# Patient Record
Sex: FEMALE | Race: WHITE | Employment: UNEMPLOYED | URBAN - METROPOLITAN AREA
[De-identification: names, ages, dates, MRNs, and addresses within clinical notes are randomized per-mention and may not be internally consistent; named-entity substitution may affect disease eponyms.]

---

## 2024-09-06 ENCOUNTER — HOSPITAL ENCOUNTER (EMERGENCY)
Age: 24
Discharge: HOME | End: 2024-09-06
Attending: STUDENT IN AN ORGANIZED HEALTH CARE EDUCATION/TRAINING PROGRAM

## 2024-09-06 VITALS
SYSTOLIC BLOOD PRESSURE: 133 MMHG | OXYGEN SATURATION: 99 % | BODY MASS INDEX: 20.89 KG/M2 | TEMPERATURE: 98.5 F | RESPIRATION RATE: 16 BRPM | WEIGHT: 117.9 LBS | HEART RATE: 100 BPM | DIASTOLIC BLOOD PRESSURE: 81 MMHG

## 2024-09-06 DIAGNOSIS — D64.9 ANEMIA, UNSPECIFIED TYPE: ICD-10-CM

## 2024-09-06 DIAGNOSIS — R82.71 ASYMPTOMATIC BACTERIURIA DURING PREGNANCY: ICD-10-CM

## 2024-09-06 DIAGNOSIS — R11.2 NAUSEA AND VOMITING, UNSPECIFIED VOMITING TYPE: Primary | ICD-10-CM

## 2024-09-06 DIAGNOSIS — O99.891 ASYMPTOMATIC BACTERIURIA DURING PREGNANCY: ICD-10-CM

## 2024-09-06 LAB
ALBUMIN SERPL-MCNC: 4.7 G/DL (ref 3.5–5.2)
ALP SERPL-CCNC: 38 U/L (ref 35–104)
ALT SERPL-CCNC: <5 U/L (ref 0–32)
ANION GAP SERPL CALCULATED.3IONS-SCNC: 16 MMOL/L (ref 7–16)
AST SERPL-CCNC: 17 U/L (ref 0–31)
B-HCG SERPL EIA 3RD IS-ACNC: ABNORMAL MIU/ML (ref 0–7)
BACTERIA URNS QL MICRO: ABNORMAL
BASOPHILS # BLD: 0.08 K/UL (ref 0–0.2)
BASOPHILS NFR BLD: 1 % (ref 0–2)
BILIRUB SERPL-MCNC: 1 MG/DL (ref 0–1.2)
BILIRUB UR QL STRIP: NEGATIVE
BUN SERPL-MCNC: 7 MG/DL (ref 6–20)
CALCIUM SERPL-MCNC: 9.9 MG/DL (ref 8.6–10.2)
CHLORIDE SERPL-SCNC: 100 MMOL/L (ref 98–107)
CLARITY UR: ABNORMAL
CO2 SERPL-SCNC: 20 MMOL/L (ref 22–29)
COLOR UR: YELLOW
CREAT SERPL-MCNC: 0.5 MG/DL (ref 0.5–1)
EOSINOPHIL # BLD: 0.09 K/UL (ref 0.05–0.5)
EOSINOPHILS RELATIVE PERCENT: 1 % (ref 0–6)
ERYTHROCYTE [DISTWIDTH] IN BLOOD BY AUTOMATED COUNT: 20.6 % (ref 11.5–15)
GFR, ESTIMATED: >90 ML/MIN/1.73M2
GLUCOSE SERPL-MCNC: 113 MG/DL (ref 74–99)
GLUCOSE UR STRIP-MCNC: NEGATIVE MG/DL
HCT VFR BLD AUTO: 30.5 % (ref 34–48)
HGB BLD-MCNC: 8.9 G/DL (ref 11.5–15.5)
HGB UR QL STRIP.AUTO: NEGATIVE
IMM GRANULOCYTES # BLD AUTO: <0.03 K/UL (ref 0–0.58)
IMM GRANULOCYTES NFR BLD: 0 % (ref 0–5)
KETONES UR STRIP-MCNC: >80 MG/DL
LEUKOCYTE ESTERASE UR QL STRIP: ABNORMAL
LYMPHOCYTES NFR BLD: 2.66 K/UL (ref 1.5–4)
LYMPHOCYTES RELATIVE PERCENT: 38 % (ref 20–42)
MAGNESIUM SERPL-MCNC: 1.9 MG/DL (ref 1.6–2.6)
MCH RBC QN AUTO: 17.5 PG (ref 26–35)
MCHC RBC AUTO-ENTMCNC: 29.2 G/DL (ref 32–34.5)
MCV RBC AUTO: 60 FL (ref 80–99.9)
MONOCYTES NFR BLD: 0.38 K/UL (ref 0.1–0.95)
MONOCYTES NFR BLD: 5 % (ref 2–12)
NEUTROPHILS NFR BLD: 54 % (ref 43–80)
NEUTS SEG NFR BLD: 3.78 K/UL (ref 1.8–7.3)
NITRITE UR QL STRIP: NEGATIVE
PH UR STRIP: 6.5 [PH] (ref 5–9)
PLATELET # BLD AUTO: 379 K/UL (ref 130–450)
PMV BLD AUTO: 9.6 FL (ref 7–12)
POTASSIUM SERPL-SCNC: 3.8 MMOL/L (ref 3.5–5)
PROT SERPL-MCNC: 7.5 G/DL (ref 6.4–8.3)
PROT UR STRIP-MCNC: NEGATIVE MG/DL
RBC # BLD AUTO: 5.08 M/UL (ref 3.5–5.5)
RBC # BLD: ABNORMAL 10*6/UL
RBC # BLD: ABNORMAL 10*6/UL
RBC #/AREA URNS HPF: ABNORMAL /HPF
SODIUM SERPL-SCNC: 136 MMOL/L (ref 132–146)
SP GR UR STRIP: 1.02 (ref 1–1.03)
UROBILINOGEN UR STRIP-ACNC: 0.2 EU/DL (ref 0–1)
WBC #/AREA URNS HPF: ABNORMAL /HPF
WBC OTHER # BLD: 7 K/UL (ref 4.5–11.5)

## 2024-09-06 PROCEDURE — 81001 URINALYSIS AUTO W/SCOPE: CPT

## 2024-09-06 PROCEDURE — 99284 EMERGENCY DEPT VISIT MOD MDM: CPT

## 2024-09-06 PROCEDURE — 85025 COMPLETE CBC W/AUTO DIFF WBC: CPT

## 2024-09-06 PROCEDURE — 87086 URINE CULTURE/COLONY COUNT: CPT

## 2024-09-06 PROCEDURE — 84702 CHORIONIC GONADOTROPIN TEST: CPT

## 2024-09-06 PROCEDURE — 2580000003 HC RX 258: Performed by: STUDENT IN AN ORGANIZED HEALTH CARE EDUCATION/TRAINING PROGRAM

## 2024-09-06 PROCEDURE — 80053 COMPREHEN METABOLIC PANEL: CPT

## 2024-09-06 PROCEDURE — 87077 CULTURE AEROBIC IDENTIFY: CPT

## 2024-09-06 PROCEDURE — 6370000000 HC RX 637 (ALT 250 FOR IP): Performed by: STUDENT IN AN ORGANIZED HEALTH CARE EDUCATION/TRAINING PROGRAM

## 2024-09-06 PROCEDURE — 83735 ASSAY OF MAGNESIUM: CPT

## 2024-09-06 RX ORDER — CEPHALEXIN 500 MG/1
500 CAPSULE ORAL ONCE
Status: COMPLETED | OUTPATIENT
Start: 2024-09-06 | End: 2024-09-06

## 2024-09-06 RX ORDER — CEPHALEXIN 500 MG/1
500 CAPSULE ORAL 4 TIMES DAILY
Qty: 20 CAPSULE | Refills: 0 | Status: SHIPPED | OUTPATIENT
Start: 2024-09-06 | End: 2024-09-11

## 2024-09-06 RX ORDER — 0.9 % SODIUM CHLORIDE 0.9 %
1000 INTRAVENOUS SOLUTION INTRAVENOUS ONCE
Status: COMPLETED | OUTPATIENT
Start: 2024-09-06 | End: 2024-09-06

## 2024-09-06 RX ORDER — ONDANSETRON 2 MG/ML
4 INJECTION INTRAMUSCULAR; INTRAVENOUS ONCE
Status: DISCONTINUED | OUTPATIENT
Start: 2024-09-06 | End: 2024-09-07 | Stop reason: HOSPADM

## 2024-09-06 RX ADMIN — CEPHALEXIN 500 MG: 500 CAPSULE ORAL at 22:55

## 2024-09-06 RX ADMIN — SODIUM CHLORIDE 1000 ML: 9 INJECTION, SOLUTION INTRAVENOUS at 21:55

## 2024-09-06 ASSESSMENT — LIFESTYLE VARIABLES
HOW MANY STANDARD DRINKS CONTAINING ALCOHOL DO YOU HAVE ON A TYPICAL DAY: PATIENT DOES NOT DRINK
HOW OFTEN DO YOU HAVE A DRINK CONTAINING ALCOHOL: NEVER

## 2024-09-06 ASSESSMENT — PAIN - FUNCTIONAL ASSESSMENT: PAIN_FUNCTIONAL_ASSESSMENT: NONE - DENIES PAIN

## 2024-09-07 NOTE — ED PROVIDER NOTES
[facility] EMERGENCY DEPARTMENT  EMERGENCY DEPARTMENT ENCOUNTER    Pt Name: Cathi Rosales  MRN: [id number]  Birthdate 2000  Date of evaluation: 9/6/2024  Provider: Michael García MD  PCP: No primary care provider on file.  Note Started: 10:06 PM EDT 9/6/24    HPI     Patient is a 24 y.o. female presents with a chief complaint of   Chief Complaint   Patient presents with    Emesis     Around 10 wks pregnant (not verified by OBGYN)- emesis x 1 wk. Lost 8lbs in past wk.   .    Patient presents for emesis.  Patient reportedly is about 10 weeks pregnant.  Patient is a G4, P2.  Patient states that she has not had an ultrasound yet.  No abdominal pain.  No vaginal discharge.  No bleeding.  No blood in her vomit.  No dark tarry stool.  Patient does have a history of hyperemesis gravidarum and states this is consistent with previous pregnancies in fact this is probably slightly better than prior.  Patient has been taking B6 at home without improvement    Nursing Notes were all reviewed and agreed with or any disagreements were addressed in the HPI.    History From: Patient and patient's  is at bedside    Review of Systems   Pertinent positives and negatives as per HPI.     Physical Exam  Vitals and nursing note reviewed.   Constitutional:       Appearance: She is well-developed.   HENT:      Head: Normocephalic and atraumatic.   Eyes:      Conjunctiva/sclera: Conjunctivae normal.   Cardiovascular:      Rate and Rhythm: Normal rate and regular rhythm.      Heart sounds: Normal heart sounds. No murmur heard.  Pulmonary:      Effort: Pulmonary effort is normal. No respiratory distress.      Breath sounds: Normal breath sounds. No wheezing or rales.   Abdominal:      General: Bowel sounds are normal.      Palpations: Abdomen is soft.      Tenderness: There is no abdominal tenderness. There is no guarding or rebound.   Musculoskeletal:      Cervical back: Normal range of motion and neck  daily for 5 days       Diagnosis:  1. Nausea and vomiting, unspecified vomiting type    2. Anemia, unspecified type    3. Asymptomatic bacteriuria during pregnancy        Disposition:  Patient's disposition: Discharge to home  Patient's condition is stable.                                           Michael García MD  09/06/24 4368

## 2024-09-08 LAB
MICROORGANISM SPEC CULT: ABNORMAL
SERVICE CMNT-IMP: ABNORMAL
SPECIMEN DESCRIPTION: ABNORMAL

## 2024-09-09 LAB
MICROORGANISM SPEC CULT: ABNORMAL
SERVICE CMNT-IMP: ABNORMAL
SPECIMEN DESCRIPTION: ABNORMAL

## 2024-11-27 ENCOUNTER — TELEPHONE (OUTPATIENT)
Dept: OBGYN | Age: 24
End: 2024-11-27

## 2024-11-27 NOTE — TELEPHONE ENCOUNTER
Called patient to reschedule her appt originally scheduled for 12/3/24 1030 am to 12/4/24 with Carol Ann Bonilla. I left the [phone] number for her to call back at her earliest convenience in order to not interrupt her prenatal appts.

## 2024-12-04 ENCOUNTER — ROUTINE PRENATAL (OUTPATIENT)
Dept: OBGYN | Age: 24
End: 2024-12-04

## 2024-12-04 ENCOUNTER — HOSPITAL ENCOUNTER (OUTPATIENT)
Age: 24
Discharge: HOME | End: 2024-12-04

## 2024-12-04 VITALS
TEMPERATURE: 97.1 F | HEIGHT: 63 IN | OXYGEN SATURATION: 98 % | SYSTOLIC BLOOD PRESSURE: 121 MMHG | DIASTOLIC BLOOD PRESSURE: 62 MMHG | HEART RATE: 89 BPM | WEIGHT: 138 LBS | BODY MASS INDEX: 24.45 KG/M2

## 2024-12-04 DIAGNOSIS — E03.9 HYPOTHYROIDISM, UNSPECIFIED TYPE: ICD-10-CM

## 2024-12-04 DIAGNOSIS — Z3A.22 PREGNANCY WITH 22 COMPLETED WEEKS GESTATION: Primary | ICD-10-CM

## 2024-12-04 LAB
GLUCOSE URINE, POC: NEGATIVE MG/DL
PROTEIN UA: NEGATIVE
TSH SERPL DL<=0.05 MIU/L-ACNC: 2.44 UIU/ML (ref 0.27–4.2)

## 2024-12-04 PROCEDURE — 81002 URINALYSIS NONAUTO W/O SCOPE: CPT | Performed by: MIDWIFE

## 2024-12-04 PROCEDURE — 84443 ASSAY THYROID STIM HORMONE: CPT

## 2024-12-04 PROCEDURE — 36415 COLL VENOUS BLD VENIPUNCTURE: CPT

## 2024-12-04 PROCEDURE — 99203 OFFICE O/P NEW LOW 30 MIN: CPT | Performed by: MIDWIFE

## 2024-12-04 RX ORDER — FERROUS SULFATE 325(65) MG
1 TABLET ORAL 2 TIMES DAILY
COMMUNITY
Start: 2024-10-08

## 2024-12-04 RX ORDER — CHOLECALCIFEROL (VITAMIN D3) 50 MCG
1 TABLET ORAL DAILY
COMMUNITY
Start: 2024-10-08

## 2024-12-04 SDOH — ECONOMIC STABILITY: INCOME INSECURITY: HOW HARD IS IT FOR YOU TO PAY FOR THE VERY BASICS LIKE FOOD, HOUSING, MEDICAL CARE, AND HEATING?: NOT HARD AT ALL

## 2024-12-04 SDOH — ECONOMIC STABILITY: FOOD INSECURITY: WITHIN THE PAST 12 MONTHS, YOU WORRIED THAT YOUR FOOD WOULD RUN OUT BEFORE YOU GOT MONEY TO BUY MORE.: NEVER TRUE

## 2024-12-04 SDOH — ECONOMIC STABILITY: FOOD INSECURITY: WITHIN THE PAST 12 MONTHS, THE FOOD YOU BOUGHT JUST DIDN'T LAST AND YOU DIDN'T HAVE MONEY TO GET MORE.: NEVER TRUE

## 2024-12-04 ASSESSMENT — PATIENT HEALTH QUESTIONNAIRE - PHQ9
SUM OF ALL RESPONSES TO PHQ9 QUESTIONS 1 & 2: 0
1. LITTLE INTEREST OR PLEASURE IN DOING THINGS: NOT AT ALL
SUM OF ALL RESPONSES TO PHQ QUESTIONS 1-9: 0
2. FEELING DOWN, DEPRESSED OR HOPELESS: NOT AT ALL

## 2024-12-04 NOTE — PROGRESS NOTES
, return OB at 22w5d weeks    Patient Active Problem List   Diagnosis    Hypothyroidism        Subjective:  doing well    Had anatomy scan at Darleen's office    Feels very tired for the past week or so, getting plenty of sleep, \"too much\".  Has a history of hypothyroidism and was treated in previous pregnancies.    Not getting much sunlight    Bleeding no   Leaking of fluid no   Painful cramping/contractions no   Headache no   Epigastric pain no   Edema no     Fetal movement + since 18-19 weeks    Objective:  See flowsheet    Vitals:    24 1122   BP: 121/62   Pulse: 89   Temp: 97.1 °F (36.2 °C)   SpO2: 98%     Hx hypothyroidism, last TSH not available to review    FH 22      Assessment:   at 22w5d   Blood pressure WNL  Size equals dates  Total weight gain appropriate   rhogam:  Not eligible    ICD-10-CM    1. Pregnancy with 22 completed weeks gestation  Z3A.22       2. Hypothyroidism, unspecified type  E03.9 TSH           Plan:    RTO 4 weeks  Continue prenatal vitamins    Orders Placed This Encounter   Procedures    TSH     Standing Status:   Future     Standing Expiration Date:   2025      Fetal movement:  Baby should move 10 times every 2 hours.  If movements decrease below 10 in 2 hours, or decrease from what is typical for that pregnancy, patient should eat something, drink something, and lay down to count movements.  If she does not feel 10 movements after doing these things, she is to immediately proceed to L&D for NST.  She should NOT WAIT until the next day.     labor precautions discussed with patient.  Patient should report >4 contractions/tightenings in 1 hour after first emptying bladder, laying down, drinking 2 large glasses of water.  Should also promptly report any vaginal bleeding, menstrual-type cramping, dysuria, urgency or frequency.

## 2024-12-04 NOTE — PROGRESS NOTES
New patient alert and pleasant with complaints of feeling burned out the last couple weeks.   Here today for initial prenatal visit, she sees Darleen Edwards  Urine for glucose and protein obtained   Discharge instructions have been discussed with the patient. Patient advised to call our office with any questions or concerns.   Voiced understanding.

## 2024-12-30 ENCOUNTER — TELEPHONE (OUTPATIENT)
Dept: OBGYN | Age: 24
End: 2024-12-30

## 2024-12-30 ENCOUNTER — ROUTINE PRENATAL (OUTPATIENT)
Dept: OBGYN | Age: 24
End: 2024-12-30
Payer: COMMERCIAL

## 2024-12-30 ENCOUNTER — HOSPITAL ENCOUNTER (OUTPATIENT)
Age: 24
Discharge: HOME | End: 2024-12-30
Payer: COMMERCIAL

## 2024-12-30 VITALS
SYSTOLIC BLOOD PRESSURE: 110 MMHG | DIASTOLIC BLOOD PRESSURE: 68 MMHG | HEART RATE: 94 BPM | BODY MASS INDEX: 25.33 KG/M2 | WEIGHT: 143 LBS

## 2024-12-30 DIAGNOSIS — O26.02 EXCESSIVE WEIGHT GAIN DURING PREGNANCY IN SECOND TRIMESTER: ICD-10-CM

## 2024-12-30 DIAGNOSIS — Z86.39 HISTORY OF HYPOTHYROIDISM: ICD-10-CM

## 2024-12-30 DIAGNOSIS — O44.42 LOW LYING PLACENTA NOS OR WITHOUT HEMORRHAGE, SECOND TRIMESTER: ICD-10-CM

## 2024-12-30 DIAGNOSIS — Z3A.26 26 WEEKS GESTATION OF PREGNANCY: ICD-10-CM

## 2024-12-30 DIAGNOSIS — O99.012 ANEMIA AFFECTING PREGNANCY IN SECOND TRIMESTER: ICD-10-CM

## 2024-12-30 DIAGNOSIS — Z3A.26 26 WEEKS GESTATION OF PREGNANCY: Primary | ICD-10-CM

## 2024-12-30 LAB
ALBUMIN SERPL-MCNC: 3.5 G/DL (ref 3.5–5.2)
ALP SERPL-CCNC: 58 U/L (ref 35–104)
ALT SERPL-CCNC: 13 U/L (ref 0–32)
AMOUNT GLUCOSE GIVEN: 50 G
ANION GAP SERPL CALCULATED.3IONS-SCNC: 9 MMOL/L (ref 7–16)
AST SERPL-CCNC: 16 U/L (ref 0–31)
BILIRUB SERPL-MCNC: 0.4 MG/DL (ref 0–1.2)
BUN SERPL-MCNC: 6 MG/DL (ref 6–20)
CALCIUM SERPL-MCNC: 8.6 MG/DL (ref 8.6–10.2)
CHLORIDE SERPL-SCNC: 101 MMOL/L (ref 98–107)
CO2 SERPL-SCNC: 24 MMOL/L (ref 22–29)
COLLECT TIME, 1HR GLUCOSE: 1418
CREAT SERPL-MCNC: 0.4 MG/DL (ref 0.5–1)
ERYTHROCYTE [DISTWIDTH] IN BLOOD BY AUTOMATED COUNT: 20.5 % (ref 11.5–15)
GFR, ESTIMATED: >90 ML/MIN/1.73M2
GLUCOSE 3H P 100 G GLC PO SERPL-MCNC: 127 MG/DL
GLUCOSE SERPL-MCNC: 123 MG/DL (ref 74–99)
GLUCOSE URINE, POC: NEGATIVE MG/DL
HCT VFR BLD AUTO: 31.3 % (ref 34–48)
HGB BLD-MCNC: 9.5 G/DL (ref 11.5–15.5)
MCH RBC QN AUTO: 23.6 PG (ref 26–35)
MCHC RBC AUTO-ENTMCNC: 30.4 G/DL (ref 32–34.5)
MCV RBC AUTO: 77.7 FL (ref 80–99.9)
PLATELET # BLD AUTO: 273 K/UL (ref 130–450)
PMV BLD AUTO: 10.6 FL (ref 7–12)
POTASSIUM SERPL-SCNC: 3.5 MMOL/L (ref 3.5–5)
PROT SERPL-MCNC: 6.7 G/DL (ref 6.4–8.3)
PROTEIN UA: NEGATIVE
RBC # BLD AUTO: 4.03 M/UL (ref 3.5–5.5)
SODIUM SERPL-SCNC: 134 MMOL/L (ref 132–146)
TSH SERPL DL<=0.05 MIU/L-ACNC: 2.05 UIU/ML (ref 0.27–4.2)
WBC OTHER # BLD: 7.2 K/UL (ref 4.5–11.5)

## 2024-12-30 PROCEDURE — 36415 COLL VENOUS BLD VENIPUNCTURE: CPT

## 2024-12-30 PROCEDURE — 81002 URINALYSIS NONAUTO W/O SCOPE: CPT | Performed by: ADVANCED PRACTICE MIDWIFE

## 2024-12-30 PROCEDURE — 80053 COMPREHEN METABOLIC PANEL: CPT

## 2024-12-30 PROCEDURE — 99213 OFFICE O/P EST LOW 20 MIN: CPT | Performed by: ADVANCED PRACTICE MIDWIFE

## 2024-12-30 PROCEDURE — 0502F SUBSEQUENT PRENATAL CARE: CPT | Performed by: ADVANCED PRACTICE MIDWIFE

## 2024-12-30 PROCEDURE — 82950 GLUCOSE TEST: CPT

## 2024-12-30 PROCEDURE — 85027 COMPLETE CBC AUTOMATED: CPT

## 2024-12-30 PROCEDURE — 84443 ASSAY THYROID STIM HORMONE: CPT

## 2024-12-30 NOTE — PROGRESS NOTES
, return OB at 26w3d weeks    Patient Active Problem List   Diagnosis    Hypothyroidism        Subjective:  doing well    Bleeding no   Leaking of fluid no   Painful cramping/contractions no   Headache no   Epigastric pain no   Edema no     Fetal movement good, patient reports 10 movements in 2 hours    Objective:  See flowsheet    Vitals:    24 1038   BP: 110/68   Pulse: 94       FH 31  -130s  Breech  LGA  Excessive weight gain discussed and risks for GDM  Last u/s in Nov showed low lying placenta, will rohit for MFM  Labs ordered to include Tsh    The patient had her 28 week labs ordered.   @(BLOODTYPE)@  BLOOD TYPING:  Lab Results   Component Value Date    LABANTI NEG 2021    ABORH O POS 2021     RPR:  No components found for: \"LABRPR\"    Glucola screening was not done yet completed:  Results reviewed.  Glucose testing not done yet.     Tdap Vaccine discussed      Assessment:   at 26w3d   Blood pressure WNL  Size greater than dates, Breech  Hx Low lying placenta  Total weight gain excessive up 14# in 4wk   rhogam:  Not eligible    ICD-10-CM    1. 26 weeks gestation of pregnancy  Z3A.26 POCT urine qual dipstick protein     POCT urine qual dipstick glucose           Plan:    RTO 2 weeks  Continue current medications  Ref to MFM  Labs ordered    Orders Placed This Encounter   Procedures    POCT urine qual dipstick protein    POCT urine qual dipstick glucose      Fetal movement:  Baby should move 10 times every 2 hours.  If movements decrease below 10 in 2 hours, or decrease from what is typical for that pregnancy, patient should eat something, drink something, and lay down to count movements.  If she does not feel 10 movements after doing these things, she is to immediately proceed to L&D for NST.  She should NOT WAIT until the next day.     labor precautions discussed with patient.  Patient should report >4 contractions/tightenings in 1 hour after first emptying

## 2024-12-30 NOTE — TELEPHONE ENCOUNTER
Pt was notified of all labs results being wnl except for iron. She does take a supplement and it has improved from 3mo ago, but I rec take supplement bid instead of daily to avoid risk for bld transfusion after birth. Pt states understanding.

## 2025-01-13 ENCOUNTER — ROUTINE PRENATAL (OUTPATIENT)
Dept: OBGYN | Age: 25
End: 2025-01-13
Payer: COMMERCIAL

## 2025-01-13 VITALS
DIASTOLIC BLOOD PRESSURE: 73 MMHG | SYSTOLIC BLOOD PRESSURE: 112 MMHG | HEART RATE: 90 BPM | BODY MASS INDEX: 25.51 KG/M2 | WEIGHT: 144 LBS

## 2025-01-13 DIAGNOSIS — Z3A.28 28 WEEKS GESTATION OF PREGNANCY: Primary | ICD-10-CM

## 2025-01-13 DIAGNOSIS — D50.8 OTHER IRON DEFICIENCY ANEMIA: ICD-10-CM

## 2025-01-13 LAB
GLUCOSE URINE, POC: NEGATIVE MG/DL
PROTEIN UA: NEGATIVE

## 2025-01-13 PROCEDURE — 81002 URINALYSIS NONAUTO W/O SCOPE: CPT | Performed by: ADVANCED PRACTICE MIDWIFE

## 2025-01-13 PROCEDURE — 99213 OFFICE O/P EST LOW 20 MIN: CPT | Performed by: ADVANCED PRACTICE MIDWIFE

## 2025-01-13 SDOH — ECONOMIC STABILITY: FOOD INSECURITY: WITHIN THE PAST 12 MONTHS, THE FOOD YOU BOUGHT JUST DIDN'T LAST AND YOU DIDN'T HAVE MONEY TO GET MORE.: NEVER TRUE

## 2025-01-13 SDOH — ECONOMIC STABILITY: FOOD INSECURITY: WITHIN THE PAST 12 MONTHS, YOU WORRIED THAT YOUR FOOD WOULD RUN OUT BEFORE YOU GOT MONEY TO BUY MORE.: NEVER TRUE

## 2025-01-13 ASSESSMENT — PATIENT HEALTH QUESTIONNAIRE - PHQ9
SUM OF ALL RESPONSES TO PHQ9 QUESTIONS 1 & 2: 0
SUM OF ALL RESPONSES TO PHQ QUESTIONS 1-9: 0
SUM OF ALL RESPONSES TO PHQ QUESTIONS 1-9: 0
1. LITTLE INTEREST OR PLEASURE IN DOING THINGS: NOT AT ALL
2. FEELING DOWN, DEPRESSED OR HOPELESS: NOT AT ALL
SUM OF ALL RESPONSES TO PHQ QUESTIONS 1-9: 0
SUM OF ALL RESPONSES TO PHQ QUESTIONS 1-9: 0

## 2025-01-13 NOTE — PROGRESS NOTES
, return OB at 28w3d weeks    Patient Active Problem List   Diagnosis    Hypothyroidism        Subjective:  doing well no c/o, takiing iron 2 po daily    Bleeding no   Leaking of fluid no   Painful cramping/contractions no   Headache no   Epigastric pain no   Edema no     Fetal movement good, patient reports 10 movements in 2 hours    Objective:  See flowsheet    Vitals:    25 1523   BP: 112/73   Pulse: 90     Reviewed all labs with pt. Reviewed how to take iron.   FH 27.5  FHT 120s    The patient had her 28 week labs.   @(BLOODTYPE)@  BLOOD TYPING:  Lab Results   Component Value Date    LABANTI NEG 2021    ABORH O POS 2021     RPR:  No components found for: \"LABRPR\"    Glucola screening was completed:  Results reviewed.  Labs reviewed and wnl except for KUMAR - enc to take iron 1 po qod, dried apricots, cook in cast iron skillet, take iron w/vit C/juice or fuit.    Tdap Vaccine discussed      Assessment:   at 28w3d   Blood pressure WNL  Size equals dates  Total weight gain appropriate   rhogam:  Not eligible    ICD-10-CM    1. 28 weeks gestation of pregnancy  Z3A.28 POCT urine qual dipstick protein     POCT urine qual dipstick glucose      2. Other iron deficiency anemia  D50.8            Plan:    RTO 2 weeks  Continue current medications  Pt educ: PIH and 3rd tri discomforts    Orders Placed This Encounter   Procedures    POCT urine qual dipstick protein    POCT urine qual dipstick glucose      Fetal movement:  Baby should move 10 times every 2 hours.  If movements decrease below 10 in 2 hours, or decrease from what is typical for that pregnancy, patient should eat something, drink something, and lay down to count movements.  If she does not feel 10 movements after doing these things, she is to immediately proceed to L&D for NST.  She should NOT WAIT until the next day.     labor precautions discussed with patient.  Patient should report >4 contractions/tightenings in

## 2025-01-13 NOTE — PATIENT INSTRUCTIONS
Patient Education        Preeclampsia: Care Instructions  Preeclampsia is high blood pressure and signs of organ damage, usually after 20 weeks of pregnancy. If it's not managed, it can harm you or your baby and lead to dangerous seizures (eclampsia).    Most people with preeclampsia have healthy babies. Preeclampsia usually goes away in the weeks after birth.   In rare cases, symptoms of preeclampsia don't show up until days or weeks after childbirth.     Monitor yourself for symptoms of preeclampsia.  Call your doctor if you have symptoms such as a severe headache, vision changes, or sudden swelling in your face and hands.     Keep track of your blood pressure at home if your doctor asks you to.  Ask your doctor to make sure that the monitor is working and that you're using it right. Follow instructions about when to take your blood pressure and what to avoid before taking your blood pressure.     Take medicines exactly as prescribed.  You may need to take medicine to control your blood pressure.     Don't smoke.  If you smoke, quit or cut back as much as you can. If you need help quitting, talk to your doctor.     Gain a healthy amount of weight.  Talk with your doctor about how much weight gain is healthy for you. Gaining too much weight while you're pregnant may be harmful.   When should you call for help?  Share this information with your partner or a friend. They can help you watch for warning signs.  Call 911  anytime you think you may need emergency care. For example, call if:    You passed out (lost consciousness).     You have a seizure.     You have trouble breathing.     You have chest pain.   Call your doctor now or seek immediate medical care if:    You have symptoms of preeclampsia, such as:  Sudden swelling of your face, hands, or feet.  New vision problems (such as dimness, blurring, or seeing spots).  A severe headache.     Your blood pressure is very high, such as 160/110 or higher.     Your

## 2025-01-27 ENCOUNTER — ROUTINE PRENATAL (OUTPATIENT)
Dept: OBGYN | Age: 25
End: 2025-01-27
Payer: COMMERCIAL

## 2025-01-27 VITALS
DIASTOLIC BLOOD PRESSURE: 63 MMHG | HEART RATE: 101 BPM | WEIGHT: 149 LBS | SYSTOLIC BLOOD PRESSURE: 114 MMHG | BODY MASS INDEX: 26.39 KG/M2

## 2025-01-27 DIAGNOSIS — O99.013 MATERNAL IRON DEFICIENCY ANEMIA AFFECTING PREGNANCY IN THIRD TRIMESTER, ANTEPARTUM: ICD-10-CM

## 2025-01-27 DIAGNOSIS — D50.9 MATERNAL IRON DEFICIENCY ANEMIA AFFECTING PREGNANCY IN THIRD TRIMESTER, ANTEPARTUM: ICD-10-CM

## 2025-01-27 DIAGNOSIS — E55.9 VITAMIN D DEFICIENCY: ICD-10-CM

## 2025-01-27 DIAGNOSIS — Z3A.30 30 WEEKS GESTATION OF PREGNANCY: Primary | ICD-10-CM

## 2025-01-27 LAB
GLUCOSE URINE, POC: NEGATIVE MG/DL
PROTEIN UA: NEGATIVE

## 2025-01-27 PROCEDURE — 81002 URINALYSIS NONAUTO W/O SCOPE: CPT | Performed by: ADVANCED PRACTICE MIDWIFE

## 2025-01-27 PROCEDURE — 99213 OFFICE O/P EST LOW 20 MIN: CPT | Performed by: ADVANCED PRACTICE MIDWIFE

## 2025-01-27 PROCEDURE — 0502F SUBSEQUENT PRENATAL CARE: CPT | Performed by: ADVANCED PRACTICE MIDWIFE

## 2025-01-27 NOTE — PROGRESS NOTES
, return OB at 30w3d weeks    Patient Active Problem List   Diagnosis    Hypothyroidism        Subjective:  doing well    Bleeding no   Leaking of fluid no   Painful cramping/contractions no   Headache no   Epigastric pain no   Edema no     Fetal movement good, patient reports 10 movements in 2 hours    Objective:  See flowsheet    Vitals:    25 1151   BP: 114/63   Pulse: (!) 101       FH 33  -150s  Pt is rohit for u/s in 2 wk  The patient had her 28 week labs completed.   @(BLOODTYPE)@  BLOOD TYPING:  Lab Results   Component Value Date    LABANTI NEG 2021    ABORH O POS 2021     RPR:  No components found for: \"LABRPR\"    Glucola screening was completed:  Results reviewed.      Tdap Vaccine discussed - declined by pt      Assessment:   at 30w3d   Blood pressure WNL  Size greater than dates  Total weight gain appropriate   rhogam:  Not eligible    ICD-10-CM    1. 30 weeks gestation of pregnancy  Z3A.30 POCT urine qual dipstick protein     POCT urine qual dipstick glucose           Plan:    RTO 2 weeks  Continue current medications  Discussed Peds, Car seat safety, bsf    Orders Placed This Encounter   Procedures    POCT urine qual dipstick protein    POCT urine qual dipstick glucose      Fetal movement:  Baby should move 10 times every 2 hours.  If movements decrease below 10 in 2 hours, or decrease from what is typical for that pregnancy, patient should eat something, drink something, and lay down to count movements.  If she does not feel 10 movements after doing these things, she is to immediately proceed to L&D for NST.  She should NOT WAIT until the next day.     labor precautions discussed with patient.  Patient should report >4 contractions/tightenings in 1 hour after first emptying bladder, laying down, drinking 2 large glasses of water.  Should also promptly report any vaginal bleeding, menstrual-type cramping, dysuria, urgency or frequency.

## 2025-01-27 NOTE — PATIENT INSTRUCTIONS
Patient Education        Breastfeeding: Care Instructions  Breastfeeding (sometimes called chestfeeding) is a skill that gets easier with practice. Try to be patient with yourself and your baby. You're both learning how to breastfeed.    Breastfeeding has many benefits. It can help you bond with your baby. It may lower your baby's chances of getting an infection.   If you have trouble breastfeeding, talk to your doctor, midwife, or lactation consultant. Support can also come from a trusted friend or family member who knows how to breastfeed.         Eat a variety of foods.   Choose vegetables, fruits, milk products, whole grains, and proteins.        Try to limit or avoid certain things.   Limit alcohol. It can pass through breast milk to your baby.  Avoid smoking, vaping, marijuana, and other drugs.  Try to reduce caffeine if your baby is fussy and has problems with sleep.  Avoid fish high in mercury. These include shark, swordfish, judith mackerel, marlin, orange roughy, and bigeye tuna, as well as tilefish from the [place].        Talk to your doctor about medicines and supplements.   Some can affect your breast milk or your baby.        Try different breastfeeding positions.   Find what works for you and your baby.  Different holds include cradle, cross-cradle, football, Australian, laid back, and side-lying.        Take care of yourself.   Take steps to prevent painful or cracked nipples.  Make sure your baby feeds with a good latch.  Ask for help if you're having pain while breastfeeding.  Try letting some breast milk dry on your nipples.  Rest when you can, and drink plenty of water. And ask for help if you need it.  When should you call for help?   Call your doctor now or seek immediate medical care if:    You have symptoms of a breast infection, such as:  Increased pain, swelling, redness, or warmth around a breast.  Red streaks extending from the breast.  Pus draining from a breast.  A fever.     Your

## 2025-02-10 ENCOUNTER — ANCILLARY PROCEDURE (OUTPATIENT)
Dept: OBGYN CLINIC | Age: 25
End: 2025-02-10
Payer: COMMERCIAL

## 2025-02-10 ENCOUNTER — ROUTINE PRENATAL (OUTPATIENT)
Dept: OBGYN | Age: 25
End: 2025-02-10
Payer: COMMERCIAL

## 2025-02-10 ENCOUNTER — INITIAL PRENATAL (OUTPATIENT)
Dept: OBGYN CLINIC | Age: 25
End: 2025-02-10
Payer: COMMERCIAL

## 2025-02-10 VITALS
SYSTOLIC BLOOD PRESSURE: 115 MMHG | OXYGEN SATURATION: 99 % | BODY MASS INDEX: 26.72 KG/M2 | HEIGHT: 63 IN | WEIGHT: 150.8 LBS | TEMPERATURE: 97.2 F | HEART RATE: 97 BPM | DIASTOLIC BLOOD PRESSURE: 73 MMHG

## 2025-02-10 VITALS
DIASTOLIC BLOOD PRESSURE: 68 MMHG | OXYGEN SATURATION: 99 % | HEART RATE: 91 BPM | WEIGHT: 151.6 LBS | TEMPERATURE: 98.7 F | SYSTOLIC BLOOD PRESSURE: 104 MMHG | BODY MASS INDEX: 26.85 KG/M2

## 2025-02-10 DIAGNOSIS — E03.9 HYPOTHYROIDISM, UNSPECIFIED TYPE: ICD-10-CM

## 2025-02-10 DIAGNOSIS — O44.52 LOW LYING PLACENTA WITH HEMORRHAGE, SECOND TRIMESTER: ICD-10-CM

## 2025-02-10 DIAGNOSIS — O36.5930 POOR FETAL GROWTH AFFECTING MANAGEMENT OF MOTHER IN THIRD TRIMESTER, SINGLE OR UNSPECIFIED FETUS: ICD-10-CM

## 2025-02-10 DIAGNOSIS — Z34.93 ENCOUNTER FOR SUPERVISION OF NORMAL PREGNANCY IN THIRD TRIMESTER, UNSPECIFIED GRAVIDITY: Primary | ICD-10-CM

## 2025-02-10 DIAGNOSIS — Z86.2 HISTORY OF ANEMIA: ICD-10-CM

## 2025-02-10 DIAGNOSIS — O36.5930 POOR FETAL GROWTH AFFECTING MANAGEMENT OF MOTHER IN THIRD TRIMESTER, SINGLE OR UNSPECIFIED FETUS: Primary | ICD-10-CM

## 2025-02-10 DIAGNOSIS — Z36.9 ENCOUNTER FOR FETAL ULTRASOUND: ICD-10-CM

## 2025-02-10 DIAGNOSIS — Z81.8 FAMILY HISTORY OF AUTISM: ICD-10-CM

## 2025-02-10 DIAGNOSIS — Z36.3 ENCOUNTER FOR ANTENATAL SCREENING FOR MALFORMATION USING ULTRASOUND: ICD-10-CM

## 2025-02-10 DIAGNOSIS — R94.8 ABNORMAL PLACENTA FUNCTION TEST: ICD-10-CM

## 2025-02-10 LAB
GLUCOSE URINE, POC: NEGATIVE MG/DL
PROTEIN UA: NEGATIVE

## 2025-02-10 PROCEDURE — 99204 OFFICE O/P NEW MOD 45 MIN: CPT | Performed by: OBSTETRICS & GYNECOLOGY

## 2025-02-10 PROCEDURE — 99203 OFFICE O/P NEW LOW 30 MIN: CPT | Performed by: OBSTETRICS & GYNECOLOGY

## 2025-02-10 PROCEDURE — 99213 OFFICE O/P EST LOW 20 MIN: CPT | Performed by: ADVANCED PRACTICE MIDWIFE

## 2025-02-10 PROCEDURE — 81002 URINALYSIS NONAUTO W/O SCOPE: CPT | Performed by: OBSTETRICS & GYNECOLOGY

## 2025-02-10 PROCEDURE — 76820 UMBILICAL ARTERY ECHO: CPT | Performed by: OBSTETRICS & GYNECOLOGY

## 2025-02-10 PROCEDURE — 76811 OB US DETAILED SNGL FETUS: CPT | Performed by: OBSTETRICS & GYNECOLOGY

## 2025-02-10 PROCEDURE — 76821 MIDDLE CEREBRAL ARTERY ECHO: CPT | Performed by: OBSTETRICS & GYNECOLOGY

## 2025-02-10 PROCEDURE — 0502F SUBSEQUENT PRENATAL CARE: CPT | Performed by: ADVANCED PRACTICE MIDWIFE

## 2025-02-10 PROCEDURE — 76818 FETAL BIOPHYS PROFILE W/NST: CPT | Performed by: OBSTETRICS & GYNECOLOGY

## 2025-02-10 PROCEDURE — 76817 TRANSVAGINAL US OBSTETRIC: CPT | Performed by: OBSTETRICS & GYNECOLOGY

## 2025-02-10 NOTE — PATIENT INSTRUCTIONS
Please arrive for your scheduled appointment at least 15 minutes early with your actual insurance card+ a photo ID. Also if you need any refills ordered or have questions, it may take up 48 hours to reply. Please allow ample time for your refills. Call me when you use last refill. Thank you for your cooperation. You might be having an NST at your next appt. Please eat a large snack or breakfast before coming to office. Thank youCall your primary obstetrician with bleeding, leaking of fluid, abdominal tenderness, headache, blurry vision, epigastric pain and increased urinary frequency.If you are experiencing an emergency and need immediate help, call 911 or go to go emergency room or labor and delivery. Do kick counts after dinner. Call your primary obstetrician if less than 10 kicks in 2 hours after dinner.     Call your primary obstetrician with bleeding, leaking of fluid, abdominal tenderness, headache, blurry vision, epigastric pain and increased urinary frequency.if you are sick, not feeling well or have an infectious process going on please reschedule your appointment by calling [phone]. Also if any family members are not feeling well, please do not bring them to your appointment. We appreciate your cooperation. We are doing this in order to protect our pregnant mothers+ their babies.if you are sick, not feeling well or have an infectious process going on please reschedule your appointment by calling [phone]. Also if any family members are not feeling well, please do not bring them to your appointment. We appreciate your cooperation. We are doing this in order to protect our pregnant mothers+ their babies.

## 2025-02-10 NOTE — PROGRESS NOTES
, return OB at 32w3d weeks    Patient Active Problem List   Diagnosis    Hypothyroidism    Poor fetal growth affecting management of mother in third trimester    Abnormal placenta function test    Family history of autism        Subjective:  doing well, however had MFM kala just now, baby still breech and measuring small and something w/cord doplars, has to been seen again in 4days.     Bleeding no   Leaking of fluid no   Painful cramping/contractions no   Headache no   Epigastric pain no   Edema no     Fetal movement good, patient reports 10 movements in 2 hours    Objective:  See flowsheet    Vitals:    02/10/25 1627   BP: 104/68   Pulse: 91   Temp: 98.7 °F (37.1 °C)   SpO2: 99%     Reviewed MFM report w/pt: explained breech can still move, will show her breech exercises today. Enc eat high  protein diet 5-6 mpd. hydrate, stress reduction.     FH 34  -140s  S > D breech    Reviewed MFM report w/pt today: breech, poor fetal growth, elevated doplar studies. Enc pt to keep kala in 4 days w/MFM, questions answered.   The patient had her 28 week labs completed.   @(BLOODTYPE)@  BLOOD TYPING:  Lab Results   Component Value Date    LABANTI NEG 2021    ABORH O POS 2021     RPR:  No components found for: \"LABRPR\"    Glucola screening was completed:  Results reviewed.      Tdap Vaccine discussed - declines today      Assessment:   at 32w3d   Blood pressure WNL  Size greater than dates  Total weight gain appropriate   rhogam:  Not eligible    Encounter Diagnosis   Name Primary?    Encounter for supervision of normal pregnancy in third trimester, unspecified  Yes          Plan:    RTO 2 weeks  Continue current medications  Birth plan given  Educ as above  Breech exercises, high protein diet, hydrate, stress reduction.    No orders of the defined types were placed in this encounter.      Darleen Edwards, APRN - CNM        Fetal movement:  Baby should move 10 times every 2

## 2025-02-10 NOTE — PROGRESS NOTES
Cathi Rosales here for ultrasound today.   She denies bleeding, lof, or cramping. Pt has been having some pelvic pain.  Positive fetal movement.

## 2025-02-10 NOTE — PATIENT INSTRUCTIONS
Cathi Rosales   [unfilled]   [facility]  EDC: 4/[facility]/2025      My goal is to provide a beautiful birth experience for you. Your birth should be as you desire. The following is a list of options during this process. Talk about you feelings, thoughts and plans with your partner, and then choose from the list. Feel free to add others so that I may better understand how you want your birth to be. Understand that at anytime these may change due to medical need or possibly by your choice if you should change your mind. Here are options, that as long as all is well with both you and baby, I will do my best to honor.         [] Father or birth partner to remain with mother at all times.     [] Family / friends to participate.   [] Children are welcome, just have an extra adult to attend to them.     [] Continuous fetal monitoring.     [] Intermittent fetal monitoring.     [] Intravenous fluids (IV).     [] Oral fluids (juice, Jell-O, popcicles, water, etc).     [] We request no medication to be offered. We prefer relaxation, breathing techniques, music, shower/ tub, birth ball, massage and other compfort measures for pain relief.      [] Analgesi. c medication if needed.      [] Epidural when indicated      [] We request no artificial speeding of labor by (AROM or pitocin).We prefer natural methods such as walking, position changes, birth ball, shower, nipple stimulation, etc.      [] Enhance the speed of labor by your discretion by AROM or pitocin.      [] Gravity enhancing positions and position of comfort for pushing and delivery.     [] Have the bed broken and feet placed in stirrups for pushing and delivery.      [] Try for intact perineum using massage, support, warm compresses, gentle delivery of the head     [] If episiotomy becomes necessary, we prefer cutting when the perineal skin is most stretched.      [] We prefer local anesthesia and elective or prophylactic episiotomy.      [] Father to

## 2025-02-10 NOTE — PROGRESS NOTES
Laura Coleman, DO  [address]  [address] [place] [address]     RE:  ACTHI ROSALES  : 2000   AGE: 25 y.o.    This report has been created using voice recognition software. It may contain errors which are inherent in voice recognition technology.    Dear Dr. Coleman:    Cathi Rosales had an appointment today for the following indications:    Patient Active Problem List   Diagnosis    Hypothyroidism    Poor fetal growth affecting management of mother in third trimester    Abnormal placenta function test    Family history of autism     Cathi Rosales is a 25 y.o. female, who is G4(2,0,1,2). She has an Estimated Date of Delivery: 2025 based on her established dates.  She is currently 32 weeks 3 days gestation based on that assessment.     The patient was seen for an ultrasound evaluation and consultation today secondary to a history of hypothyroidism.  The patient's TSH was 2.05 on 2024.  She currently takes no thyroid medication.    The patient understands that she is at increased risk for having a child with autism spectrum disorder because of the history of autism spectrum disorder in the patient's father, and the father to the baby's brother and mother. She further understands that autism cannot be detected by ultrasound.    Autism spectrum disorder is a serious neurodevelopmental disorder that impairs a child's ability to communicate and interact with others. It also includes restricted repetitive behaviors, interests and activities. These issues cause significant impairment in social, occupational and other areas of functioning.    Autism spectrum disorder (ASD) is now defined by the American Psychiatric Association's Diagnosis and Statistical Manual of Mental Disorders (DSM-5) as a single disorder that includes disorders that were previously considered separate -- autism, Asperger's syndrome, childhood disintegrative disorder and pervasive developmental disorder not otherwise  Undermining Type: Entire Wound

## 2025-02-24 ENCOUNTER — ROUTINE PRENATAL (OUTPATIENT)
Dept: OBGYN | Age: 25
End: 2025-02-24
Payer: COMMERCIAL

## 2025-02-24 ENCOUNTER — TELEPHONE (OUTPATIENT)
Dept: OBGYN CLINIC | Age: 25
End: 2025-02-24

## 2025-02-24 VITALS
SYSTOLIC BLOOD PRESSURE: 117 MMHG | HEART RATE: 98 BPM | DIASTOLIC BLOOD PRESSURE: 73 MMHG | WEIGHT: 153 LBS | BODY MASS INDEX: 27.1 KG/M2

## 2025-02-24 DIAGNOSIS — O09.293 PRIOR POOR OBSTETRICAL HISTORY, ANTEPARTUM, THIRD TRIMESTER: ICD-10-CM

## 2025-02-24 DIAGNOSIS — Z3A.25 25 WEEKS GESTATION OF PREGNANCY: Primary | ICD-10-CM

## 2025-02-24 LAB
GLUCOSE URINE, POC: NEGATIVE MG/DL
PROTEIN UA: NEGATIVE

## 2025-02-24 PROCEDURE — 99213 OFFICE O/P EST LOW 20 MIN: CPT | Performed by: ADVANCED PRACTICE MIDWIFE

## 2025-02-24 PROCEDURE — 81002 URINALYSIS NONAUTO W/O SCOPE: CPT | Performed by: ADVANCED PRACTICE MIDWIFE

## 2025-02-24 PROCEDURE — 0502F SUBSEQUENT PRENATAL CARE: CPT | Performed by: ADVANCED PRACTICE MIDWIFE

## 2025-02-24 NOTE — PROGRESS NOTES
, return OB at 34w3d weeks    Patient Active Problem List   Diagnosis    Hypothyroidism    Poor fetal growth affecting management of mother in third trimester    Abnormal placenta function test    Family history of autism        Subjective:  doing well, doing breech exercises, unsure of position?    Bleeding no   Leaking of fluid no   Painful cramping/contractions no   Headache no   Epigastric pain no   Edema no     Fetal movement good, patient reports 10 movements in 2 hours    Objective:  See flowsheet    Vitals:    25 1120   BP: 117/73   Pulse: 98       FH 34  FHT 120s  Breech/Lie by Leopolds today    The patient had her 28 week labs completed.   @(BLOODTYPE)@  BLOOD TYPING:  Lab Results   Component Value Date    LABANTI NEG 2021    ABORH O POS 2021     RPR:  No components found for: \"LABRPR\"    Glucola screening was completed:  Results reviewed.      Tdap Vaccine discussed - offerred declines      Assessment:   at 34w3d   Blood pressure WNL  Size equals dates  Total weight gain appropriate   rhogam:  Not eligible    Encounter Diagnosis   Name Primary?    25 weeks gestation of pregnancy Yes          Plan:    RTO 2 weeks  Continue current medications  Keep kala w/MFM this Thrs - call me after u/s - if still breech will rohit for ECV w/Dr Soto    Forgot birth plan, bring to next kala.     Orders Placed This Encounter    POCT urine qual dipstick glucose    POCT urine qual dipstick protein       Darleen Edwards, APRN - CNM        Fetal movement:  Baby should move 10 times every 2 hours.  If movements decrease below 10 in 2 hours, or decrease from what is typical for that pregnancy, patient should eat something, drink something, and lay down to count movements.  If she does not feel 10 movements after doing these things, she is to immediately proceed to L&D for NST.  She should NOT WAIT until the next day.     labor precautions discussed with patient.  Patient should

## 2025-02-27 ENCOUNTER — ROUTINE PRENATAL (OUTPATIENT)
Dept: OBGYN CLINIC | Age: 25
End: 2025-02-27
Payer: COMMERCIAL

## 2025-02-27 ENCOUNTER — ANCILLARY PROCEDURE (OUTPATIENT)
Dept: OBGYN CLINIC | Age: 25
End: 2025-02-27
Payer: COMMERCIAL

## 2025-02-27 VITALS
SYSTOLIC BLOOD PRESSURE: 103 MMHG | WEIGHT: 153.5 LBS | TEMPERATURE: 97.7 F | HEART RATE: 98 BPM | DIASTOLIC BLOOD PRESSURE: 71 MMHG | HEIGHT: 63 IN | OXYGEN SATURATION: 97 % | BODY MASS INDEX: 27.2 KG/M2

## 2025-02-27 DIAGNOSIS — Z86.2 HISTORY OF ANEMIA: ICD-10-CM

## 2025-02-27 DIAGNOSIS — R94.8 ABNORMAL PLACENTA FUNCTION TEST: ICD-10-CM

## 2025-02-27 DIAGNOSIS — E03.9 HYPOTHYROIDISM, UNSPECIFIED TYPE: ICD-10-CM

## 2025-02-27 DIAGNOSIS — O36.5930 POOR FETAL GROWTH AFFECTING MANAGEMENT OF MOTHER IN THIRD TRIMESTER, SINGLE OR UNSPECIFIED FETUS: Primary | ICD-10-CM

## 2025-02-27 DIAGNOSIS — Z36.9 ENCOUNTER FOR FETAL ULTRASOUND: ICD-10-CM

## 2025-02-27 LAB
GLUCOSE URINE, POC: NEGATIVE MG/DL
PROTEIN UA: NEGATIVE

## 2025-02-27 PROCEDURE — 76818 FETAL BIOPHYS PROFILE W/NST: CPT | Performed by: OBSTETRICS & GYNECOLOGY

## 2025-02-27 PROCEDURE — 99999 PR OFFICE/OUTPT VISIT,PROCEDURE ONLY: CPT | Performed by: OBSTETRICS & GYNECOLOGY

## 2025-02-27 PROCEDURE — 76820 UMBILICAL ARTERY ECHO: CPT | Performed by: OBSTETRICS & GYNECOLOGY

## 2025-02-27 PROCEDURE — 99213 OFFICE O/P EST LOW 20 MIN: CPT | Performed by: OBSTETRICS & GYNECOLOGY

## 2025-02-27 PROCEDURE — 81002 URINALYSIS NONAUTO W/O SCOPE: CPT | Performed by: OBSTETRICS & GYNECOLOGY

## 2025-02-27 PROCEDURE — 76816 OB US FOLLOW-UP PER FETUS: CPT | Performed by: OBSTETRICS & GYNECOLOGY

## 2025-02-27 PROCEDURE — 76821 MIDDLE CEREBRAL ARTERY ECHO: CPT | Performed by: OBSTETRICS & GYNECOLOGY

## 2025-02-27 NOTE — PATIENT INSTRUCTIONS
Please arrive for your scheduled appointment at least 15 minutes early with your actual insurance card+ a photo ID. Also if you need any refills ordered or have questions, it may take up 48 hours to reply. Please allow ample time for your refills. Call me when you use last refill. Thank you for your cooperation. Please arrive for your scheduled appointment at least 15 minutes early with your actual insurance card+ a photo ID. Also if you need any refills ordered or have questions, it may take up 48 hours to reply. Please allow ample time for your refills. Call me when you use last refill. Thank you for your cooperation. You might be having an NST at your next appt. Please eat a large snack or breakfast before coming to office. Thank youCall your primary obstetrician with bleeding, leaking of fluid, abdominal tenderness, headache, blurry vision, epigastric pain and increased urinary frequency.If you are experiencing an emergency and need immediate help, call 911 or go to go emergency room or labor and delivery. Do kick counts after dinner. Call your primary obstetrician if less than 10 kicks in 2 hours after dinner.     Call your primary obstetrician with bleeding, leaking of fluid, abdominal tenderness, headache, blurry vision, epigastric pain and increased urinary frequency.if you are sick, not feeling well or have an infectious process going on please reschedule your appointment by calling [phone]. Also if any family members are not feeling well, please do not bring them to your appointment. We appreciate your cooperation. We are doing this in order to protect our pregnant mothers+ their babies.if you are sick, not feeling well or have an infectious process going on please reschedule your appointment by calling [phone]. Also if any family members are not feeling well, please do not bring them to your appointment. We appreciate your cooperation. We are doing this in order to protect our pregnant mothers+

## 2025-02-28 NOTE — PROGRESS NOTES
Cathi Rosales here for follow up ultrasound today and nst.  She denies bleeding, lof, or cramping.   Positive fetal movement.     
cm.  The head circumference with the 46th growth percentile.  The fetal abdominal circumference was at the 13th growth percentile.  The biophysical profile was scored 10 of 10.  The cord Doppler SD ratios were in the upper normal range.  There was no absence, or reversal of end-diastolic flow in the samples evaluated.  The MCA PSV was 1.28 MOM.  This value is not associated with fetal anemia or polycythemia.  CPR PI was 1.53.  This value is not associated with compensatory centralization of fetal blood flow.                     GENETIC SCREENING/TERATOLOGY COUNSELING                  (Includes patient, FTB, and any affected family members)    Patient Age > 35 Years NO   Thalassemia ( MVC<80) NO   Congential Heart Defect NO   Neural Tube Defect NO   Laci-Sachs NO   Sickle Cell Disease NO   Sickle Cell Trait NO   Sickle C Disease or Trait NO   Hemophilia NO   Muscular Dystrophy NO   Cystic Fibrosis NO   Viry Disease NO   Autism: FTB Mother and brother. Patient's father. YES   Mental Retardation NO   History of Fragile X NO   Maternal Diabetes NO   Other Genetic Disease or Syndrome NO   Previous Child With Congenital Abnormality Not Listed NO   Recreational Drugs NO                                        INFECTION HISTORY     HEPATITIS IMMUNIZED NO   HEPATITIS INFECTION NO   EXPOSURE TO TB NO   GENITAL HERPES  NO   PARVOVIRUS B-19 NO   CHICKEN POX  NO   MEASLES NO   HIV NO     OB History    Para Term  AB Living   4 2 2    1 2   SAB IAB Ectopic Molar Multiple Live Births           1 2      # Outcome Date GA Lbr Aleksandar/2nd Weight Sex Type Anes PTL Lv   3 Current            2 Term 21 40w3d  3.147 kg (6 lb 15 oz) M Vag-Spont Local N BASIM   1  17 39w6d  3.09 kg (6 lb 13 oz) M  EPI  BASIM       PAST GYNECOLOGICAL  HISTORY:  Negative for abnormal pap smears.   Negative for sexually transmitted diseases.   Negative for cervical LEEP / conization /cryosurgery.    Negative for uterine surgery.

## 2025-03-06 ENCOUNTER — ROUTINE PRENATAL (OUTPATIENT)
Dept: OBGYN CLINIC | Age: 25
End: 2025-03-06
Payer: COMMERCIAL

## 2025-03-06 ENCOUNTER — ANCILLARY PROCEDURE (OUTPATIENT)
Dept: OBGYN CLINIC | Age: 25
End: 2025-03-06
Payer: COMMERCIAL

## 2025-03-06 VITALS
HEART RATE: 100 BPM | BODY MASS INDEX: 27.46 KG/M2 | DIASTOLIC BLOOD PRESSURE: 69 MMHG | RESPIRATION RATE: 12 BRPM | WEIGHT: 155 LBS | OXYGEN SATURATION: 98 % | SYSTOLIC BLOOD PRESSURE: 109 MMHG | TEMPERATURE: 97.4 F

## 2025-03-06 DIAGNOSIS — O36.5930 POOR FETAL GROWTH AFFECTING MANAGEMENT OF MOTHER IN THIRD TRIMESTER, SINGLE OR UNSPECIFIED FETUS: Primary | ICD-10-CM

## 2025-03-06 DIAGNOSIS — Z81.8 FAMILY HISTORY OF AUTISM: ICD-10-CM

## 2025-03-06 DIAGNOSIS — O99.280 HYPOTHYROIDISM AFFECTING PREGNANCY, ANTEPARTUM: ICD-10-CM

## 2025-03-06 DIAGNOSIS — R94.8 ABNORMAL PLACENTA FUNCTION TEST: ICD-10-CM

## 2025-03-06 DIAGNOSIS — E03.9 HYPOTHYROIDISM AFFECTING PREGNANCY, ANTEPARTUM: ICD-10-CM

## 2025-03-06 LAB
GLUCOSE URINE, POC: NORMAL MG/DL
PROTEIN UA: NEGATIVE

## 2025-03-06 PROCEDURE — 81002 URINALYSIS NONAUTO W/O SCOPE: CPT | Performed by: OBSTETRICS & GYNECOLOGY

## 2025-03-06 PROCEDURE — 99213 OFFICE O/P EST LOW 20 MIN: CPT | Performed by: OBSTETRICS & GYNECOLOGY

## 2025-03-06 PROCEDURE — 76818 FETAL BIOPHYS PROFILE W/NST: CPT | Performed by: OBSTETRICS & GYNECOLOGY

## 2025-03-06 PROCEDURE — 76815 OB US LIMITED FETUS(S): CPT | Performed by: OBSTETRICS & GYNECOLOGY

## 2025-03-06 PROCEDURE — 76820 UMBILICAL ARTERY ECHO: CPT | Performed by: OBSTETRICS & GYNECOLOGY

## 2025-03-06 PROCEDURE — 76821 MIDDLE CEREBRAL ARTERY ECHO: CPT | Performed by: OBSTETRICS & GYNECOLOGY

## 2025-03-07 PROBLEM — O99.280 HYPOTHYROIDISM AFFECTING PREGNANCY, ANTEPARTUM: Status: ACTIVE | Noted: 2021-08-11

## 2025-03-08 NOTE — PROGRESS NOTES
Patient is here today for bpp/nst. Denies any vaginal bleeding, cramping, or leakage of fluids.  Patient reports good fetal movement.     
  GENITOURINARY : No dysuria, hematuria and no incontinence   MUSCULOSKELETAL : No myalgia, No back pain  NEUROLOGICAL : No numbness, no tingling, no tremors. No history of seizures  ALL OTHER SYSTEMS WERE REPORTED AS NEGATIVE.    Vital signs:   /69   Pulse 100   Temp 97.4 °F (36.3 °C) (Temporal)   Resp 12   Wt 70.3 kg (155 lb)   LMP 2024   SpO2 98%   BMI 27.46 kg/m²   Urine dipstick:   Negative for Glucose    Negative for Albumin    IMPRESSION:  1.  IUP at 35 weeks 6 days Estimated Date of Delivery: 2025  2.  Poor fetal growth. HC 46th%. AC 7th%. 2/10/2025  3.  Elevated cord Doppler SD ratios without AREDF. 2/10/2025  4.  Reassuring BPP 2/10/2025   5.  NIPT not available for review on 2/10/2025.  6.  Family history of autism and the patient's father and father to the baby's brother and mother.  7.  HC 46th%. AC 13th% on 2025  8.  Reassuring biophysical profile, MCA and cord Doppler studies on 3/6/2025  9.  Reactive nonstress test on 3/6/2025    PLAN:  I would recommend that the patient count fetal movements and call if she notices any subjective decrease in fetal movements, particularly if there are less than 10 major movements in 2 hours. Non-stress testing should be performed every 3 to 4 days through the balance of the pregnancy. Serial ultrasounds to assess fetal anatomy and growth should be performed. The patient is at increase risk for  morbidity and mortality secondary to her history. Weekly BPP and cord Doppler testing should be performed, unless there is a clinical indication to perform the testing more frequently.    The patient was advised to call if she has any increased vaginal discharge, vaginal bleeding, contractions, abdominal pain, back pain or any new significant symptomatology prior to her next visit. I advised her that these are signs and symptoms of cervical change and require follow-up assessment when they occur.    I requested the patient return for a

## 2025-03-10 ENCOUNTER — TELEPHONE (OUTPATIENT)
Dept: OBGYN | Age: 25
End: 2025-03-10

## 2025-03-10 ENCOUNTER — ROUTINE PRENATAL (OUTPATIENT)
Dept: OBGYN CLINIC | Age: 25
End: 2025-03-10
Payer: COMMERCIAL

## 2025-03-10 ENCOUNTER — ROUTINE PRENATAL (OUTPATIENT)
Dept: OBGYN | Age: 25
End: 2025-03-10
Payer: COMMERCIAL

## 2025-03-10 ENCOUNTER — HOSPITAL ENCOUNTER (OUTPATIENT)
Age: 25
Discharge: HOME | End: 2025-03-10
Payer: COMMERCIAL

## 2025-03-10 VITALS
TEMPERATURE: 97.3 F | WEIGHT: 156 LBS | BODY MASS INDEX: 27.64 KG/M2 | OXYGEN SATURATION: 99 % | DIASTOLIC BLOOD PRESSURE: 76 MMHG | RESPIRATION RATE: 16 BRPM | HEART RATE: 96 BPM | HEIGHT: 63 IN | SYSTOLIC BLOOD PRESSURE: 112 MMHG

## 2025-03-10 VITALS
HEART RATE: 103 BPM | DIASTOLIC BLOOD PRESSURE: 73 MMHG | SYSTOLIC BLOOD PRESSURE: 111 MMHG | BODY MASS INDEX: 27.46 KG/M2 | WEIGHT: 155 LBS

## 2025-03-10 DIAGNOSIS — O36.5931 SMALL FOR DATES AFFECTING MANAGEMENT OF MOTHER, THIRD TRIMESTER, FETUS 1: ICD-10-CM

## 2025-03-10 DIAGNOSIS — O99.013 ANEMIA AFFECTING PREGNANCY IN THIRD TRIMESTER: ICD-10-CM

## 2025-03-10 DIAGNOSIS — Z3A.36 36 WEEKS GESTATION OF PREGNANCY: Primary | ICD-10-CM

## 2025-03-10 DIAGNOSIS — R94.8 ABNORMAL PLACENTA FUNCTION TEST: ICD-10-CM

## 2025-03-10 DIAGNOSIS — O99.280 HYPOTHYROIDISM AFFECTING PREGNANCY, ANTEPARTUM: ICD-10-CM

## 2025-03-10 DIAGNOSIS — Z86.39 HISTORY OF HYPOTHYROIDISM: ICD-10-CM

## 2025-03-10 DIAGNOSIS — O36.5930 POOR FETAL GROWTH AFFECTING MANAGEMENT OF MOTHER IN THIRD TRIMESTER, SINGLE OR UNSPECIFIED FETUS: ICD-10-CM

## 2025-03-10 DIAGNOSIS — O36.5930 POOR FETAL GROWTH AFFECTING MANAGEMENT OF MOTHER IN SINGLETON PREGNANCY IN THIRD TRIMESTER: ICD-10-CM

## 2025-03-10 DIAGNOSIS — E03.9 HYPOTHYROIDISM AFFECTING PREGNANCY, ANTEPARTUM: ICD-10-CM

## 2025-03-10 LAB
ERYTHROCYTE [DISTWIDTH] IN BLOOD BY AUTOMATED COUNT: 17.9 % (ref 11.5–15)
GLUCOSE URINE, POC: NEGATIVE MG/DL
HCT VFR BLD AUTO: 36.1 % (ref 34–48)
HGB BLD-MCNC: 11.2 G/DL (ref 11.5–15.5)
IRON SATN MFR SERPL: 8 % (ref 15–50)
IRON SERPL-MCNC: 48 UG/DL (ref 37–145)
MCH RBC QN AUTO: 25.2 PG (ref 26–35)
MCHC RBC AUTO-ENTMCNC: 31 G/DL (ref 32–34.5)
MCV RBC AUTO: 81.1 FL (ref 80–99.9)
PLATELET # BLD AUTO: 229 K/UL (ref 130–450)
PMV BLD AUTO: 11.6 FL (ref 7–12)
PROTEIN UA: NEGATIVE
RBC # BLD AUTO: 4.45 M/UL (ref 3.5–5.5)
TIBC SERPL-MCNC: 565 UG/DL (ref 250–450)
TSH SERPL DL<=0.05 MIU/L-ACNC: 2.14 UIU/ML (ref 0.27–4.2)
WBC OTHER # BLD: 9.2 K/UL (ref 4.5–11.5)

## 2025-03-10 PROCEDURE — 81002 URINALYSIS NONAUTO W/O SCOPE: CPT | Performed by: ADVANCED PRACTICE MIDWIFE

## 2025-03-10 PROCEDURE — 99213 OFFICE O/P EST LOW 20 MIN: CPT | Performed by: OBSTETRICS & GYNECOLOGY

## 2025-03-10 PROCEDURE — 85027 COMPLETE CBC AUTOMATED: CPT

## 2025-03-10 PROCEDURE — 83540 ASSAY OF IRON: CPT

## 2025-03-10 PROCEDURE — 59025 FETAL NON-STRESS TEST: CPT | Performed by: OBSTETRICS & GYNECOLOGY

## 2025-03-10 PROCEDURE — 83550 IRON BINDING TEST: CPT

## 2025-03-10 PROCEDURE — 99213 OFFICE O/P EST LOW 20 MIN: CPT | Performed by: ADVANCED PRACTICE MIDWIFE

## 2025-03-10 PROCEDURE — 36415 COLL VENOUS BLD VENIPUNCTURE: CPT

## 2025-03-10 PROCEDURE — 0502F SUBSEQUENT PRENATAL CARE: CPT | Performed by: ADVANCED PRACTICE MIDWIFE

## 2025-03-10 PROCEDURE — 83020 HEMOGLOBIN ELECTROPHORESIS: CPT

## 2025-03-10 PROCEDURE — 99999 PR OFFICE/OUTPT VISIT,PROCEDURE ONLY: CPT | Performed by: OBSTETRICS & GYNECOLOGY

## 2025-03-10 PROCEDURE — 84443 ASSAY THYROID STIM HORMONE: CPT

## 2025-03-10 NOTE — TELEPHONE ENCOUNTER
Can you please schedule her next few visits with aDrleen?  I have to get her NSTs scheduled on Monday with HE and I want to make sure the times work with her womens apts.

## 2025-03-10 NOTE — PROGRESS NOTES
, return OB at 36w3d weeks    Patient Active Problem List   Diagnosis    Hypothyroidism affecting pregnancy, antepartum    Poor fetal growth affecting management of mother in third trimester    Abnormal placenta function test    Family history of autism        Subjective:  doing well    Bleeding no   Leaking of fluid no   Painful cramping/contractions yes - few BHCs yesterday only   Headache no   Epigastric pain no   Edema no     Fetal movement good, patient reports 10 movements in 2 hours    Objective:  See flowsheet  Vitals:    03/10/25 1148   BP: 111/73   Pulse: (!) 103     S < D. Fhts 140s, Cephalic by Leopolds,   Pelvic: GBS obtained. Pt declined Gc/Ct.   Reviewed MFM report does show poor fetal growth w/ AC 13% Hc 46%  Rec Nst 2 x wk, pt only rohit for weekly, will change that today    Assessment:   at 36w3d   Blood pressure WNL  Size less than dates  Total weight gain appropriate  Not eligible    Encounter Diagnoses   Name Primary?    36 weeks gestation of pregnancy Yes    Small for dates affecting management of mother, third trimester, fetus 1     Poor fetal growth affecting management of mother in arndt pregnancy in third trimester     Anemia affecting pregnancy in third trimester     History of hypothyroidism          Plan:  No pregnancy checklist tasks were completed during this visit, and no tasks are pending completion.   Rec high protein diet, hydrate 8-10 gpd flds,   Rohit Nst 2 x wk, Keep MFM kala weekly as rohit.  RTO 1 weeks  Continue current medications  Labor prec given and reviewed  FMCs    Orders Placed This Encounter    Culture, Strep B Screen, Vaginal/Rectal    TSH     Standing Status:   Future     Expected Date:   3/10/2025     Expiration Date:   3/10/2026    CBC     Standing Status:   Future     Expected Date:   3/10/2025     Expiration Date:   3/10/2026    Hemoglobinopathy Evaluation     Standing Status:   Future     Expected Date:   3/10/2025     Expiration Date:   3/10/2026

## 2025-03-10 NOTE — PATIENT INSTRUCTIONS
Patient Education         Signs of Labor (01:39)  Your health professional recommends that you watch this short online health video.  Learn the signs of labor so you'll know what to do when you're ready to have your baby.   Purpose: Outlines labor signs, including timing of contractions, [place], and having a plan to call the doctor.  Goal: The user will understand the signs of labor and learn what to do when labor starts.    Watch: Scan the QR code or visit the link to view video       https://hwi.[email or web address]  Current as of: April 30, 2024  Content Version: 14.3  © 2024 [employer].   Care instructions adapted under license by [employer]. If you have questions about a medical condition or this instruction, always ask your healthcare professional. [employer] disclaims any warranty or liability for your use of this information.

## 2025-03-10 NOTE — PROGRESS NOTES
Patient alert and pleasant with no complaints.  Here today for prenatal visit.  Urine for glucose and protein obtained   Discharge instructions have been discussed with the patient. Patient advised to call our office with any questions or concerns.   Voiced understanding.   GBS culture obtained, labeled and sent to lab

## 2025-03-12 LAB
HGB ELECTROPHORESIS INTERP: NORMAL
PATHOLOGIST: NORMAL

## 2025-03-13 ENCOUNTER — ROUTINE PRENATAL (OUTPATIENT)
Dept: OBGYN CLINIC | Age: 25
End: 2025-03-13
Payer: COMMERCIAL

## 2025-03-13 ENCOUNTER — ANCILLARY PROCEDURE (OUTPATIENT)
Dept: OBGYN CLINIC | Age: 25
End: 2025-03-13
Payer: COMMERCIAL

## 2025-03-13 VITALS
OXYGEN SATURATION: 98 % | SYSTOLIC BLOOD PRESSURE: 111 MMHG | TEMPERATURE: 97.3 F | DIASTOLIC BLOOD PRESSURE: 72 MMHG | BODY MASS INDEX: 27.27 KG/M2 | HEIGHT: 63 IN | WEIGHT: 153.9 LBS | HEART RATE: 104 BPM

## 2025-03-13 DIAGNOSIS — E03.9 HYPOTHYROIDISM AFFECTING PREGNANCY, ANTEPARTUM: ICD-10-CM

## 2025-03-13 DIAGNOSIS — O99.280 HYPOTHYROIDISM AFFECTING PREGNANCY, ANTEPARTUM: ICD-10-CM

## 2025-03-13 DIAGNOSIS — O36.5930 POOR FETAL GROWTH AFFECTING MANAGEMENT OF MOTHER IN THIRD TRIMESTER, SINGLE OR UNSPECIFIED FETUS: Primary | ICD-10-CM

## 2025-03-13 DIAGNOSIS — R94.8 ABNORMAL PLACENTA FUNCTION TEST: ICD-10-CM

## 2025-03-13 DIAGNOSIS — Z81.8 FAMILY HISTORY OF AUTISM: ICD-10-CM

## 2025-03-13 LAB
GLUCOSE URINE, POC: NEGATIVE MG/DL
PROTEIN UA: NEGATIVE

## 2025-03-13 PROCEDURE — 76820 UMBILICAL ARTERY ECHO: CPT | Performed by: OBSTETRICS & GYNECOLOGY

## 2025-03-13 PROCEDURE — 76818 FETAL BIOPHYS PROFILE W/NST: CPT | Performed by: OBSTETRICS & GYNECOLOGY

## 2025-03-13 PROCEDURE — 99213 OFFICE O/P EST LOW 20 MIN: CPT | Performed by: OBSTETRICS & GYNECOLOGY

## 2025-03-13 PROCEDURE — 81002 URINALYSIS NONAUTO W/O SCOPE: CPT | Performed by: OBSTETRICS & GYNECOLOGY

## 2025-03-13 PROCEDURE — 76821 MIDDLE CEREBRAL ARTERY ECHO: CPT | Performed by: OBSTETRICS & GYNECOLOGY

## 2025-03-13 PROCEDURE — 76816 OB US FOLLOW-UP PER FETUS: CPT | Performed by: OBSTETRICS & GYNECOLOGY

## 2025-03-13 NOTE — PROGRESS NOTES
Cathi Rosales here for follow up ultrasound today and nst.  She denies bleeding, lof, or cramping.   Positive fetal movement.

## 2025-03-13 NOTE — PATIENT INSTRUCTIONS
Please arrive for your scheduled appointment at least 15 minutes early with your actual insurance card+ a photo ID. Also if you need any refills ordered or have questions, it may take up 48 hours to reply. Please allow ample time for your refills. Call me when you use last refill. Thank you for your cooperation. You might be having an NST at your next appt. Please eat a large snack or breakfast before coming to office. Thank youCall your primary obstetrician with bleeding, leaking of fluid, abdominal tenderness, headache, blurry vision, epigastric pain and increased urinary frequency.If you are experiencing an emergency and need immediate help, call 911 or go to go emergency room or labor and delivery. Do kick counts after dinner. Call your primary obstetrician if less than 10 kicks in 2 hours after dinner.     Call your primary obstetrician with bleeding, leaking of fluid, abdominal tenderness, headache, blurry vision, epigastric pain and increased urinary frequency.if you are sick, not feeling well or have an infectious process going on please reschedule your appointment by calling [phone]. Also if any family members are not feeling well, please do not bring them to your appointment. We appreciate your cooperation. We are doing this in order to protect our pregnant mothers+ their babies.if you are sick, not feeling well or have an infectious process going on please reschedule your appointment by calling [phone]. Also if any family members are not feeling well, please do not bring them to your appointment. We appreciate your cooperation. We are doing this in order to protect our pregnant mothers+ their babies.

## 2025-03-14 LAB
CULTURE: NORMAL
SPECIMEN DESCRIPTION: NORMAL

## 2025-03-17 ENCOUNTER — ROUTINE PRENATAL (OUTPATIENT)
Dept: OBGYN CLINIC | Age: 25
End: 2025-03-17
Payer: COMMERCIAL

## 2025-03-17 ENCOUNTER — ROUTINE PRENATAL (OUTPATIENT)
Dept: OBGYN | Age: 25
End: 2025-03-17
Payer: COMMERCIAL

## 2025-03-17 VITALS
WEIGHT: 157 LBS | BODY MASS INDEX: 27.81 KG/M2 | DIASTOLIC BLOOD PRESSURE: 76 MMHG | SYSTOLIC BLOOD PRESSURE: 131 MMHG | HEART RATE: 98 BPM

## 2025-03-17 VITALS
WEIGHT: 157 LBS | DIASTOLIC BLOOD PRESSURE: 77 MMHG | TEMPERATURE: 97.4 F | HEART RATE: 86 BPM | BODY MASS INDEX: 27.82 KG/M2 | OXYGEN SATURATION: 97 % | HEIGHT: 63 IN | SYSTOLIC BLOOD PRESSURE: 121 MMHG

## 2025-03-17 DIAGNOSIS — O36.5931 IUGR (INTRAUTERINE GROWTH RESTRICTION) AFFECTING CARE OF MOTHER, THIRD TRIMESTER, FETUS 1: Primary | ICD-10-CM

## 2025-03-17 DIAGNOSIS — O36.5931 SMALL FOR DATES AFFECTING MANAGEMENT OF MOTHER, THIRD TRIMESTER, FETUS 1: ICD-10-CM

## 2025-03-17 DIAGNOSIS — R94.8 ABNORMAL PLACENTA FUNCTION TEST: ICD-10-CM

## 2025-03-17 DIAGNOSIS — O99.280 HYPOTHYROIDISM AFFECTING PREGNANCY, ANTEPARTUM: ICD-10-CM

## 2025-03-17 DIAGNOSIS — Z3A.37 37 WEEKS GESTATION OF PREGNANCY: ICD-10-CM

## 2025-03-17 DIAGNOSIS — O36.5930 POOR FETAL GROWTH AFFECTING MANAGEMENT OF MOTHER IN THIRD TRIMESTER, SINGLE OR UNSPECIFIED FETUS: Primary | ICD-10-CM

## 2025-03-17 DIAGNOSIS — E03.9 HYPOTHYROIDISM AFFECTING PREGNANCY, ANTEPARTUM: ICD-10-CM

## 2025-03-17 LAB
GLUCOSE URINE, POC: NEGATIVE MG/DL
PROTEIN UA: NEGATIVE

## 2025-03-17 PROCEDURE — 99213 OFFICE O/P EST LOW 20 MIN: CPT | Performed by: OBSTETRICS & GYNECOLOGY

## 2025-03-17 PROCEDURE — 99213 OFFICE O/P EST LOW 20 MIN: CPT | Performed by: ADVANCED PRACTICE MIDWIFE

## 2025-03-17 PROCEDURE — 81002 URINALYSIS NONAUTO W/O SCOPE: CPT | Performed by: ADVANCED PRACTICE MIDWIFE

## 2025-03-17 PROCEDURE — 99999 PR OFFICE/OUTPT VISIT,PROCEDURE ONLY: CPT | Performed by: OBSTETRICS & GYNECOLOGY

## 2025-03-17 PROCEDURE — 59025 FETAL NON-STRESS TEST: CPT | Performed by: OBSTETRICS & GYNECOLOGY

## 2025-03-17 NOTE — PROGRESS NOTES
Patient alert and pleasant with no complaints.  Here today for prenatal visit.  Urine for glucose and protein obtained Discharge instructions have been discussed with the patient. Patient advised to call our office with any questions or concerns.   Voiced understanding.

## 2025-03-17 NOTE — PATIENT INSTRUCTIONS
Please arrive for your scheduled appointment at least 15 minutes early with your actual insurance card+ a photo ID. Also if you need any refills ordered or have questions, it may take up 48 hours to reply. Please allow ample time for your refills. Call me when you use last refill. Thank you for your cooperation. You might be having an NST at your next appt. Please eat a large snack or breakfast before coming to office. Thank youCall your primary obstetrician with bleeding, leaking of fluid, abdominal tenderness, headache, blurry vision, epigastric pain and increased urinary frequency.If you are experiencing an emergency and need immediate help, call 911 or go to go emergency room or labor and delivery. Do kick counts after dinner. Call your primary obstetrician if less than 10 kicks in 2 hours after dinner.     Call your primary obstetrician with bleeding, leaking of fluid, abdominal tenderness, headache, blurry vision, epigastric pain and increased urinary frequency.if you are sick, not feeling well or have an infectious process going on please reschedule your appointment by calling [phone]. Also if any family members are not feeling well, please do not bring them to your appointment. We appreciate your cooperation. We are doing this in order to protect our pregnant mothers+ their babies.if you are sick, not feeling well or have an infectious process going on please reschedule your appointment by calling [phone]. Also if any family members are not feeling well, please do not bring them to your appointment. We appreciate your cooperation. We are doing this in order to protect our pregnant mothers+ their babies.

## 2025-03-17 NOTE — PROGRESS NOTES
, return OB at 37w3d weeks    Patient Active Problem List   Diagnosis    Hypothyroidism affecting pregnancy, antepartum    Poor fetal growth affecting management of mother in third trimester    Abnormal placenta function test    Family history of autism        Subjective:  doing well    Bleeding no   Leaking of fluid no   Painful cramping/contractions some BHCs, nothing timeable  Headache no   Epigastric pain no   Edema no     Fetal movement good, patient reports 10 movements in 2 hours    Objective:  See flowsheet  Vitals:    25 1200   BP: 131/76   Pulse: 98     Reviewed results of Tsh/Cbc (wbk) and GBS(-) w/pt today.  Reviewed MFM reports w/pt, prepared pt for possible induction, will see what Scooby says this week.     Assessment:   at 37w3d   Blood pressure WNL  Size less than dates  Total weight gain appropriate  Not eligible  IUGR    Encounter Diagnosis   Name Primary?    37 weeks gestation of pregnancy Yes         Plan:  No pregnancy checklist tasks were completed during this visit, and no tasks are pending completion.     RTO 1 weeks  Continue current medications  Keep kala w/MFM and Nsts  Enc high protein diet, hydrate, stress mgmt/Rest    Orders Placed This Encounter    POCT urine qual dipstick protein    POCT urine qual dipstick glucose       Darleen Edwards, APRN - CNM    Fetal movement:  Baby should move 10 times every 2 hours.  If movements decrease below 10 in 2 hours, or decrease from what is typical for that pregnancy, patient should eat something, drink something, and lay down to count movements.  If she does not feel 10 movements after doing these things, she is to immediately proceed to L&D for NST.  She should NOT WAIT until the next day.

## 2025-03-17 NOTE — PROGRESS NOTES
Cathi Rosales here for follow up NST.  She denies bleeding, lof, or cramping.   Positive fetal movement.     
subjective decrease in fetal movements, particularly if there are less than 10 major movements in 2 hours. Non-stress testing should be performed every 3 to 4 days through the balance of the pregnancy. Serial ultrasounds to assess fetal anatomy and growth should be performed. The patient is at increase risk for  morbidity and mortality secondary to her history. Weekly BPP and cord Doppler testing should be performed, unless there is a clinical indication to perform the testing more frequently.    The patient was advised to call if she has any increased vaginal discharge, vaginal bleeding, contractions, abdominal pain, back pain or any new significant symptomatology prior to her next visit. I advised her that these are signs and symptoms of cervical change and require follow-up assessment when they occur.    I requested the patient return for a follow-up assessment in 3 days unless there is a clinical reason for her to return prior to that time. She is to call if she has any problems or questions prior to her next visit.     Further evaluation and management will be dependent on her clinical presentation and the results of her testing.     The patient is to continue to follow with you in your office for ongoing obstetric care.    If you have any questions regarding her management, please contact me at your convenience and thank you for allowing me to participate in her care.    Sincerely,        Dylan Almodovar MD, MS, FACOG, FACS, RDCS, RDMS, RVT  Director Maternal-Fetal Medicine  [facility]  [phone]

## 2025-03-20 ENCOUNTER — ROUTINE PRENATAL (OUTPATIENT)
Dept: OBGYN CLINIC | Age: 25
End: 2025-03-20
Payer: COMMERCIAL

## 2025-03-20 ENCOUNTER — ANCILLARY PROCEDURE (OUTPATIENT)
Dept: OBGYN CLINIC | Age: 25
End: 2025-03-20
Payer: COMMERCIAL

## 2025-03-20 VITALS
DIASTOLIC BLOOD PRESSURE: 80 MMHG | HEART RATE: 86 BPM | SYSTOLIC BLOOD PRESSURE: 117 MMHG | BODY MASS INDEX: 27.81 KG/M2 | WEIGHT: 157 LBS | TEMPERATURE: 97.3 F

## 2025-03-20 DIAGNOSIS — O99.280 HYPOTHYROIDISM AFFECTING PREGNANCY, ANTEPARTUM: ICD-10-CM

## 2025-03-20 DIAGNOSIS — R94.8 ABNORMAL PLACENTA FUNCTION TEST: ICD-10-CM

## 2025-03-20 DIAGNOSIS — O36.5930 POOR FETAL GROWTH AFFECTING MANAGEMENT OF MOTHER IN THIRD TRIMESTER, SINGLE OR UNSPECIFIED FETUS: Primary | ICD-10-CM

## 2025-03-20 DIAGNOSIS — Z81.8 FAMILY HISTORY OF AUTISM: ICD-10-CM

## 2025-03-20 DIAGNOSIS — E03.9 HYPOTHYROIDISM AFFECTING PREGNANCY, ANTEPARTUM: ICD-10-CM

## 2025-03-20 LAB
GLUCOSE URINE, POC: NORMAL MG/DL
PROTEIN UA: NEGATIVE

## 2025-03-20 PROCEDURE — 81002 URINALYSIS NONAUTO W/O SCOPE: CPT | Performed by: OBSTETRICS & GYNECOLOGY

## 2025-03-20 PROCEDURE — 76820 UMBILICAL ARTERY ECHO: CPT | Performed by: OBSTETRICS & GYNECOLOGY

## 2025-03-20 PROCEDURE — 76821 MIDDLE CEREBRAL ARTERY ECHO: CPT | Performed by: OBSTETRICS & GYNECOLOGY

## 2025-03-20 PROCEDURE — 76818 FETAL BIOPHYS PROFILE W/NST: CPT | Performed by: OBSTETRICS & GYNECOLOGY

## 2025-03-20 PROCEDURE — 76815 OB US LIMITED FETUS(S): CPT | Performed by: OBSTETRICS & GYNECOLOGY

## 2025-03-20 PROCEDURE — 99213 OFFICE O/P EST LOW 20 MIN: CPT | Performed by: OBSTETRICS & GYNECOLOGY

## 2025-03-20 NOTE — PROGRESS NOTES
Patient is here today for bpp/nst. Denies any vaginal bleeding,, or leakage of fluids.  Cramping on and off.   Patient reports good fetal movement.

## 2025-03-24 ENCOUNTER — ROUTINE PRENATAL (OUTPATIENT)
Dept: OBGYN CLINIC | Age: 25
End: 2025-03-24
Payer: COMMERCIAL

## 2025-03-24 ENCOUNTER — ANESTHESIA (OUTPATIENT)
Dept: LABOR AND DELIVERY | Age: 25
End: 2025-03-24
Payer: COMMERCIAL

## 2025-03-24 ENCOUNTER — ANESTHESIA EVENT (OUTPATIENT)
Dept: LABOR AND DELIVERY | Age: 25
End: 2025-03-24
Payer: COMMERCIAL

## 2025-03-24 ENCOUNTER — HOSPITAL ENCOUNTER (INPATIENT)
Age: 25
LOS: 2 days | Discharge: HOME | End: 2025-03-26
Attending: OBSTETRICS & GYNECOLOGY | Admitting: OBSTETRICS & GYNECOLOGY
Payer: COMMERCIAL

## 2025-03-24 ENCOUNTER — ROUTINE PRENATAL (OUTPATIENT)
Dept: OBGYN | Age: 25
End: 2025-03-24
Payer: COMMERCIAL

## 2025-03-24 VITALS
SYSTOLIC BLOOD PRESSURE: 113 MMHG | DIASTOLIC BLOOD PRESSURE: 78 MMHG | TEMPERATURE: 97.4 F | OXYGEN SATURATION: 98 % | HEIGHT: 63 IN | WEIGHT: 159.6 LBS | BODY MASS INDEX: 28.28 KG/M2 | HEART RATE: 106 BPM | RESPIRATION RATE: 16 BRPM

## 2025-03-24 VITALS
BODY MASS INDEX: 27.94 KG/M2 | WEIGHT: 157.7 LBS | HEART RATE: 105 BPM | SYSTOLIC BLOOD PRESSURE: 104 MMHG | DIASTOLIC BLOOD PRESSURE: 68 MMHG

## 2025-03-24 DIAGNOSIS — O99.280 HYPOTHYROIDISM AFFECTING PREGNANCY, ANTEPARTUM: ICD-10-CM

## 2025-03-24 DIAGNOSIS — R94.8 ABNORMAL PLACENTA FUNCTION TEST: ICD-10-CM

## 2025-03-24 DIAGNOSIS — Z3A.38 38 WEEKS GESTATION OF PREGNANCY: Primary | ICD-10-CM

## 2025-03-24 DIAGNOSIS — E03.9 HYPOTHYROIDISM AFFECTING PREGNANCY, ANTEPARTUM: ICD-10-CM

## 2025-03-24 DIAGNOSIS — O41.03X0 OLIGOHYDRAMNIOS IN THIRD TRIMESTER, SINGLE OR UNSPECIFIED FETUS: ICD-10-CM

## 2025-03-24 DIAGNOSIS — O36.5930 POOR FETAL GROWTH AFFECTING MANAGEMENT OF MOTHER IN THIRD TRIMESTER, SINGLE OR UNSPECIFIED FETUS: Primary | ICD-10-CM

## 2025-03-24 DIAGNOSIS — O36.5930 POOR FETAL GROWTH AFFECTING MANAGEMENT OF MOTHER IN THIRD TRIMESTER, SINGLE OR UNSPECIFIED FETUS: ICD-10-CM

## 2025-03-24 LAB
ABO + RH BLD: NORMAL
AMPHET UR QL SCN: NEGATIVE
ARM BAND NUMBER: NORMAL
BARBITURATES UR QL SCN: NEGATIVE
BASOPHILS # BLD: 0.03 K/UL (ref 0–0.2)
BASOPHILS NFR BLD: 0 % (ref 0–2)
BENZODIAZ UR QL: NEGATIVE
BLOOD BANK SAMPLE EXPIRATION: NORMAL
BLOOD GROUP ANTIBODIES SERPL: NEGATIVE
BUPRENORPHINE UR QL: NEGATIVE
CANNABINOIDS UR QL SCN: NEGATIVE
COCAINE UR QL SCN: NEGATIVE
EOSINOPHIL # BLD: 0.05 K/UL (ref 0.05–0.5)
EOSINOPHILS RELATIVE PERCENT: 1 % (ref 0–6)
ERYTHROCYTE [DISTWIDTH] IN BLOOD BY AUTOMATED COUNT: 19.5 % (ref 11.5–15)
FENTANYL UR QL: NEGATIVE
GLUCOSE URINE, POC: NORMAL MG/DL
HCT VFR BLD AUTO: 38.9 % (ref 34–48)
HGB BLD-MCNC: 11.9 G/DL (ref 11.5–15.5)
IMM GRANULOCYTES # BLD AUTO: 0.05 K/UL (ref 0–0.58)
IMM GRANULOCYTES NFR BLD: 1 % (ref 0–5)
LYMPHOCYTES NFR BLD: 1.47 K/UL (ref 1.5–4)
LYMPHOCYTES RELATIVE PERCENT: 16 % (ref 20–42)
MCH RBC QN AUTO: 24.8 PG (ref 26–35)
MCHC RBC AUTO-ENTMCNC: 30.6 G/DL (ref 32–34.5)
MCV RBC AUTO: 81 FL (ref 80–99.9)
METHADONE UR QL: NEGATIVE
MONOCYTES NFR BLD: 0.5 K/UL (ref 0.1–0.95)
MONOCYTES NFR BLD: 6 % (ref 2–12)
NEUTROPHILS NFR BLD: 77 % (ref 43–80)
NEUTS SEG NFR BLD: 6.98 K/UL (ref 1.8–7.3)
OPIATES UR QL SCN: NEGATIVE
OXYCODONE UR QL SCN: NEGATIVE
PCP UR QL SCN: NEGATIVE
PLATELET # BLD AUTO: 255 K/UL (ref 130–450)
PMV BLD AUTO: 11.9 FL (ref 7–12)
PROTEIN UA: NEGATIVE
RBC # BLD AUTO: 4.8 M/UL (ref 3.5–5.5)
TEST INFORMATION: NORMAL
WBC OTHER # BLD: 9.1 K/UL (ref 4.5–11.5)

## 2025-03-24 PROCEDURE — 59025 FETAL NON-STRESS TEST: CPT | Performed by: OBSTETRICS & GYNECOLOGY

## 2025-03-24 PROCEDURE — 6370000000 HC RX 637 (ALT 250 FOR IP): Performed by: OBSTETRICS & GYNECOLOGY

## 2025-03-24 PROCEDURE — 80307 DRUG TEST PRSMV CHEM ANLYZR: CPT

## 2025-03-24 PROCEDURE — 99999 PR OFFICE/OUTPT VISIT,PROCEDURE ONLY: CPT | Performed by: OBSTETRICS & GYNECOLOGY

## 2025-03-24 PROCEDURE — 1220000001 HC SEMI PRIVATE L&D R&B

## 2025-03-24 PROCEDURE — 81002 URINALYSIS NONAUTO W/O SCOPE: CPT | Performed by: OBSTETRICS & GYNECOLOGY

## 2025-03-24 PROCEDURE — 99213 OFFICE O/P EST LOW 20 MIN: CPT | Performed by: OBSTETRICS & GYNECOLOGY

## 2025-03-24 PROCEDURE — 99221 1ST HOSP IP/OBS SF/LOW 40: CPT | Performed by: PHYSICIAN ASSISTANT

## 2025-03-24 PROCEDURE — APPNB30 APP NON BILLABLE TIME 0-30 MINS: Performed by: PHYSICIAN ASSISTANT

## 2025-03-24 PROCEDURE — 6370000000 HC RX 637 (ALT 250 FOR IP)

## 2025-03-24 PROCEDURE — 86901 BLOOD TYPING SEROLOGIC RH(D): CPT

## 2025-03-24 PROCEDURE — 0502F SUBSEQUENT PRENATAL CARE: CPT | Performed by: ADVANCED PRACTICE MIDWIFE

## 2025-03-24 PROCEDURE — 99213 OFFICE O/P EST LOW 20 MIN: CPT | Performed by: ADVANCED PRACTICE MIDWIFE

## 2025-03-24 PROCEDURE — 86900 BLOOD TYPING SEROLOGIC ABO: CPT

## 2025-03-24 PROCEDURE — 85025 COMPLETE CBC W/AUTO DIFF WBC: CPT

## 2025-03-24 PROCEDURE — 86850 RBC ANTIBODY SCREEN: CPT

## 2025-03-24 RX ORDER — LIDOCAINE HYDROCHLORIDE 10 MG/ML
INJECTION, SOLUTION INFILTRATION; PERINEURAL
Status: DISCONTINUED
Start: 2025-03-24 | End: 2025-03-25

## 2025-03-24 RX ORDER — MISOPROSTOL 200 UG/1
400 TABLET ORAL PRN
Status: DISCONTINUED | OUTPATIENT
Start: 2025-03-24 | End: 2025-03-26 | Stop reason: HOSPADM

## 2025-03-24 RX ORDER — EPHEDRINE SULFATE/0.9% NACL/PF 25 MG/5 ML
10 SYRINGE (ML) INTRAVENOUS
Status: DISCONTINUED | OUTPATIENT
Start: 2025-03-24 | End: 2025-03-25

## 2025-03-24 RX ORDER — NALOXONE HYDROCHLORIDE 0.4 MG/ML
INJECTION, SOLUTION INTRAMUSCULAR; INTRAVENOUS; SUBCUTANEOUS PRN
Status: DISCONTINUED | OUTPATIENT
Start: 2025-03-24 | End: 2025-03-25

## 2025-03-24 RX ORDER — LIDOCAINE HYDROCHLORIDE 20 MG/ML
JELLY TOPICAL
Status: DISCONTINUED
Start: 2025-03-24 | End: 2025-03-25

## 2025-03-24 RX ORDER — TERBUTALINE SULFATE 1 MG/ML
0.25 INJECTION SUBCUTANEOUS
Status: DISCONTINUED | OUTPATIENT
Start: 2025-03-24 | End: 2025-03-25

## 2025-03-24 RX ORDER — SODIUM CHLORIDE, SODIUM LACTATE, POTASSIUM CHLORIDE, CALCIUM CHLORIDE 600; 310; 30; 20 MG/100ML; MG/100ML; MG/100ML; MG/100ML
INJECTION, SOLUTION INTRAVENOUS CONTINUOUS
Status: DISCONTINUED | OUTPATIENT
Start: 2025-03-24 | End: 2025-03-25

## 2025-03-24 RX ORDER — METHYLERGONOVINE MALEATE 0.2 MG/ML
200 INJECTION INTRAVENOUS PRN
Status: DISCONTINUED | OUTPATIENT
Start: 2025-03-24 | End: 2025-03-26 | Stop reason: HOSPADM

## 2025-03-24 RX ORDER — TRANEXAMIC ACID 10 MG/ML
1000 INJECTION, SOLUTION INTRAVENOUS
Status: DISCONTINUED | OUTPATIENT
Start: 2025-03-24 | End: 2025-03-25

## 2025-03-24 RX ORDER — ONDANSETRON 4 MG/1
4 TABLET, ORALLY DISINTEGRATING ORAL EVERY 8 HOURS PRN
Status: DISCONTINUED | OUTPATIENT
Start: 2025-03-24 | End: 2025-03-25

## 2025-03-24 RX ORDER — CARBOPROST TROMETHAMINE 250 UG/ML
250 INJECTION, SOLUTION INTRAMUSCULAR PRN
Status: DISCONTINUED | OUTPATIENT
Start: 2025-03-24 | End: 2025-03-25

## 2025-03-24 RX ORDER — ONDANSETRON 2 MG/ML
4 INJECTION INTRAMUSCULAR; INTRAVENOUS EVERY 6 HOURS PRN
Status: DISCONTINUED | OUTPATIENT
Start: 2025-03-24 | End: 2025-03-25

## 2025-03-24 RX ORDER — SODIUM CHLORIDE, SODIUM LACTATE, POTASSIUM CHLORIDE, AND CALCIUM CHLORIDE .6; .31; .03; .02 G/100ML; G/100ML; G/100ML; G/100ML
500 INJECTION, SOLUTION INTRAVENOUS PRN
Status: DISCONTINUED | OUTPATIENT
Start: 2025-03-24 | End: 2025-03-25

## 2025-03-24 RX ORDER — ACETAMINOPHEN 650 MG
TABLET, EXTENDED RELEASE ORAL
Status: DISCONTINUED
Start: 2025-03-24 | End: 2025-03-25

## 2025-03-24 RX ADMIN — Medication 25 MCG: at 20:05

## 2025-03-24 RX ADMIN — Medication 25 MCG: at 15:34

## 2025-03-24 ASSESSMENT — LIFESTYLE VARIABLES: SMOKING_STATUS: 0

## 2025-03-24 ASSESSMENT — ENCOUNTER SYMPTOMS: DYSPNEA ACTIVITY LEVEL: AFTER AMBULATING 1 FLIGHT OF STAIRS

## 2025-03-24 NOTE — PROGRESS NOTES
Assumed care of pt. Pt off monitor on birthing ball. Informed pt we will place EFM on soon so we can do the next dose of cytotec. Pt agreeable.

## 2025-03-24 NOTE — PROGRESS NOTES
Patient is a , 38.3 who presents to labor and delivery for an induction. Patient states she is getting induced for IUGR and low fluid. Patient denies any LOF, VB and states +FM. Patient placed on efm and call light in reach.

## 2025-03-24 NOTE — PROGRESS NOTES
, return OB at 38w3d weeks    Patient Active Problem List   Diagnosis    Hypothyroidism affecting pregnancy, antepartum    Poor fetal growth affecting management of mother in third trimester    Abnormal placenta function test    Family history of autism        Subjective:  doing well    Bleeding no   Leaking of fluid no   Painful cramping/contractions yes - last night and this morning, but not painful just uncomfortable, not regular or timeable.   Headache no   Epigastric pain no   Edema no     Fetal movement good, patient reports 10 movements in 2 hours    Objective:  See flowsheet  Vitals:    25 1124   BP: 113/78   Pulse: (!) 106   Resp: 16   Temp: 97.4 °F (36.3 °C)   SpO2: 98%    FH 36, S = D. +FHTs 140-160s. VE: closed, long, thick, firm, posterior, ballotable.  Nst today reactive per pt  Reviewed MFM report w/pt, rec delivery betweein 38-39 egw which is where we at now.  Poor fetal growth, AC 2nd and HC 33rd percentiles, [place] at 7.3cm fld.  Discussed induction of labor w/pt and questions answered.    Assessment:   at 38w3d   Blood pressure WNL  Size equals dates  Total weight gain appropriate  Not eligible    No diagnosis found.      Plan:  No pregnancy checklist tasks were completed during this visit, and no tasks are pending completion.      Vickie induction for today, spoke w/Edith Engel, form completed and faxed.    RTO 6  weeks postpartum visit  Continue current medications  Labor prec reviewed and FMCs  Orders given to Axel JONES    No orders of the defined types were placed in this encounter.      Darleen Edwards, APRN - CNM    Fetal movement:  Baby should move 10 times every 2 hours.  If movements decrease below 10 in 2 hours, or decrease from what is typical for that pregnancy, patient should eat something, drink something, and lay down to count movements.  If she does not feel 10 movements after doing these things, she is to immediately proceed to L&D for NST.  She should NOT WAIT

## 2025-03-24 NOTE — PROGRESS NOTES
Pt presents for routine prenatal appointment.   Pt denies bleeding, or leaking of fluids.   Pt is feeling appropriate fetal movement.   Urine done with [facility]  Pharmacy and medications reviewed and verified with pt.    Minor cramping

## 2025-03-24 NOTE — H&P
Department of Obstetrics and Gynecology  Physician Assistant Obstetrics History and Physical      HISTORY OF PRESENT ILLNESS:      The patient is a 25 y.o.  3 parity 2 at 38 weeks' 3 days' gestation presents to L&D from ob office for IOL secondary to IUGR and oligohydramnios. JULIAN 7.3 cms per last u/s 2025. Has mild cramping. Cervix was checked there: closed, thick. Refer to progress note. Last seen by MFM 2025.    Current obstetric history is significant for:  Poor fetal growth. HC 33rd%. AC 2nd% on 3/13/2025   Low normal amniotic fluid volume on 3/20/2025   Elevated cord Doppler SD ratios without AREDF. 2/10/2025       Estimated Due Date:  2025  Contractions: Few  Leaking of fluid: No  Bleeding:  No  Perceived fetal movement: Good        PAST OB HISTORY:  OB History    Para Term  AB Living   3 2 2   2   SAB IAB Ectopic Molar Multiple Live Births       1 2      # Outcome Date GA Lbr Aleksandar/2nd Weight Sex Type Anes PTL Lv   3 Current            2 Term 21 40w3d  3.147 kg (6 lb 15 oz) M Vag-Spont Local N BASIM   1A  17 39w6d  3.09 kg (6 lb 13 oz) M  EPI N BASIM   1B Term                    Pre-eclampsia:  No      :  No      D & C:  No      Cerclage:  No      LEEP:  No      Myomectomy:  No       Labor: No    Past Medical History:    Diagnosis Date    Anemia     Depression     Finger fracture     Hypothyroidism         Past Surgical History:    No past surgical history on file.     Social History:    Reports that she has quit smoking. Her smoking use included cigarettes. She started smoking about 8 years ago. She has never used smokeless tobacco. She reports that she does not currently use alcohol. She reports that she does not use drugs.     No family history on file.    Blood type: O positive  Antibody screen:   Lab Results   Component Value Date    LABANTI NEG 2021     CBC:   Lab Results   Component Value Date    WBC 9.2 03/10/2025    HGB   moderate;  accelerations: present;  decelerations: absent  Cervix: SVE at office today per CNM  DILATION: Closed  EFFACEMENT:   Long  STATION:  -3 cm  Presentation: Cephalic, per u/s 2025  Contraction frequency:  none  Membranes:  Intact  Back: (-) CVA tenderness.  Extremities: (-) edema      ASSESSMENT   24 yo  IUP at 38 weeks' 3 days' gestation    Sent from [facility] for IOL secondary to IUGR and 7.3 cm JULIAN         Plan: Orders per Dr. Najera:  EFM  Admit, anticipate normal delivery, routine labor orders  Other: Cytotec, Epidural on request      Electronically signed by Catalina Wilkinson PA-C on 3/24/2025 at 2:26 PM

## 2025-03-24 NOTE — PROGRESS NOTES
Laura Coleman, DO  [address]  [address] [place] [address]     RE:  CATHI ROSALES  : 2000   AGE: 25 y.o.    This report has been created using voice recognition software. It may contain errors which are inherent in voice recognition technology.    Dear Dr. Coleman:    Cathi Rosales had fetal testing performed today for the following indications:    Patient Active Problem List   Diagnosis    Hypothyroidism affecting pregnancy, antepartum    Poor fetal growth affecting management of mother in third trimester    Abnormal placenta function test    Family history of autism    38 weeks gestation of pregnancy     Cathi Rosales is a 25 y.o. female, who is G4(2,0,1,2). She has an Estimated Date of Delivery: 2025 based on her established dates.  She is currently 38 weeks 3 days gestation based on that assessment.     The patient was initially seen for an ultrasound evaluation, and consultation secondary to a history of hypothyroidism.  The patient's TSH was 2.05 on 2024.  She currently takes no thyroid medication.  She had poor fetal growth based on the ultrasound assessment.  Her cord Doppler SD ratios were elevated.  Her fetal movement has been good.  She has no active vaginal bleeding or leaking of fluid.    The patient understands that she is at increased risk for having a child with autism spectrum disorder because of the history of autism spectrum disorder in the patient's father, and the father to the baby's brother and mother. She further understands that autism cannot be detected by ultrasound.    Autism spectrum disorder is a serious neurodevelopmental disorder that impairs a child's ability to communicate and interact with others. It also includes restricted repetitive behaviors, interests and activities. These issues cause significant impairment in social, occupational and other areas of functioning.    Autism spectrum disorder (ASD) is now defined by the American Psychiatric

## 2025-03-25 PROCEDURE — 6370000000 HC RX 637 (ALT 250 FOR IP): Performed by: OBSTETRICS & GYNECOLOGY

## 2025-03-25 PROCEDURE — 6370000000 HC RX 637 (ALT 250 FOR IP)

## 2025-03-25 PROCEDURE — 6370000000 HC RX 637 (ALT 250 FOR IP): Performed by: ADVANCED PRACTICE MIDWIFE

## 2025-03-25 PROCEDURE — 1220000000 HC SEMI PRIVATE OB R&B

## 2025-03-25 PROCEDURE — 7200000001 HC VAGINAL DELIVERY

## 2025-03-25 RX ORDER — ONDANSETRON 2 MG/ML
4 INJECTION INTRAMUSCULAR; INTRAVENOUS EVERY 6 HOURS PRN
Status: DISCONTINUED | OUTPATIENT
Start: 2025-03-25 | End: 2025-03-26 | Stop reason: HOSPADM

## 2025-03-25 RX ORDER — IBUPROFEN 800 MG/1
800 TABLET, FILM COATED ORAL EVERY 8 HOURS SCHEDULED
Status: DISCONTINUED | OUTPATIENT
Start: 2025-03-25 | End: 2025-03-25

## 2025-03-25 RX ORDER — DOCUSATE SODIUM 100 MG/1
100 CAPSULE, LIQUID FILLED ORAL 2 TIMES DAILY
Status: DISCONTINUED | OUTPATIENT
Start: 2025-03-25 | End: 2025-03-26 | Stop reason: HOSPADM

## 2025-03-25 RX ORDER — IBUPROFEN 800 MG/1
TABLET, FILM COATED ORAL
Status: COMPLETED
Start: 2025-03-25 | End: 2025-03-25

## 2025-03-25 RX ORDER — MODIFIED LANOLIN
OINTMENT (GRAM) TOPICAL PRN
Status: DISCONTINUED | OUTPATIENT
Start: 2025-03-25 | End: 2025-03-26 | Stop reason: HOSPADM

## 2025-03-25 RX ORDER — ONDANSETRON 4 MG/1
4 TABLET, ORALLY DISINTEGRATING ORAL EVERY 6 HOURS PRN
Status: DISCONTINUED | OUTPATIENT
Start: 2025-03-25 | End: 2025-03-26 | Stop reason: HOSPADM

## 2025-03-25 RX ORDER — IBUPROFEN 800 MG/1
800 TABLET, FILM COATED ORAL EVERY 8 HOURS SCHEDULED
Status: DISCONTINUED | OUTPATIENT
Start: 2025-03-25 | End: 2025-03-26 | Stop reason: HOSPADM

## 2025-03-25 RX ORDER — ACETAMINOPHEN 500 MG
1000 TABLET ORAL EVERY 8 HOURS SCHEDULED
Status: DISCONTINUED | OUTPATIENT
Start: 2025-03-25 | End: 2025-03-26 | Stop reason: HOSPADM

## 2025-03-25 RX ADMIN — Medication 25 MCG: at 00:05

## 2025-03-25 RX ADMIN — IBUPROFEN 800 MG: 800 TABLET, FILM COATED ORAL at 20:07

## 2025-03-25 RX ADMIN — IBUPROFEN 800 MG: 800 TABLET, FILM COATED ORAL at 11:42

## 2025-03-25 RX ADMIN — ACETAMINOPHEN 1000 MG: 500 TABLET ORAL at 15:23

## 2025-03-25 RX ADMIN — DOCUSATE SODIUM 100 MG: 100 CAPSULE, LIQUID FILLED ORAL at 20:07

## 2025-03-25 ASSESSMENT — PAIN DESCRIPTION - ORIENTATION: ORIENTATION: LOWER

## 2025-03-25 ASSESSMENT — PAIN DESCRIPTION - LOCATION
LOCATION: ABDOMEN
LOCATION: PERINEUM

## 2025-03-25 ASSESSMENT — PAIN SCALES - GENERAL
PAINLEVEL_OUTOF10: 3
PAINLEVEL_OUTOF10: 3
PAINLEVEL_OUTOF10: 2

## 2025-03-25 ASSESSMENT — PAIN DESCRIPTION - DESCRIPTORS
DESCRIPTORS: CRAMPING;DISCOMFORT;DULL
DESCRIPTORS: CRAMPING

## 2025-03-25 ASSESSMENT — PAIN - FUNCTIONAL ASSESSMENT: PAIN_FUNCTIONAL_ASSESSMENT: ACTIVITIES ARE NOT PREVENTED

## 2025-03-25 NOTE — ANESTHESIA PRE PROCEDURE
Department of Anesthesiology  Preprocedure Note       Name:  Cathi Rosales   Age:  25 y.o.  :  2000                                          MRN:  [id number]         Date:  3/24/2025      Surgeon: * No surgeons listed *    Procedure: Labor Epidural    Medications prior to admission:   Prior to Admission medications    Medication Sig Start Date End Date Taking? Authorizing Provider   Prenatal Vit-Fe Fumarate-FA (PRENATAL VITAMIN PO) Take by mouth   Yes ProviderArlene MD   Cholecalciferol (VITAMIN D3) 50 MCG ( UT) TABS Take 1 tablet by mouth daily 10/8/24  Yes ProviderArlene MD   FEROSUL 325 (65 Fe) MG tablet Take 1 tablet by mouth 2 times daily 10/8/24  Yes Provider, MD Arlene       Current medications:    Current Facility-Administered Medications   Medication Dose Route Frequency Provider Last Rate Last Admin    lactated ringers infusion   IntraVENous Continuous Jhoana Najera DO        lactated ringers bolus 500 mL  500 mL IntraVENous PRN Jhoana Najera DO        Or    lactated ringers bolus 500 mL  500 mL IntraVENous PRN Jhoana Najera DO        methylergonovine (METHERGINE) injection 200 mcg  200 mcg IntraMUSCular PRN Jhoana Najera,         carboprost (HEMABATE) injection 250 mcg  250 mcg IntraMUSCular PRN Jhoana Najera,         miSOPROStol (CYTOTEC) tablet 400 mcg  400 mcg Buccal PRN Jhoana Najera DO        tranexamic acid-NaCl IVPB premix 1,000 mg  1,000 mg IntraVENous Once PRN Jhoana Najera DO        oxytocin (PITOCIN) 30 units in 500 mL infusion  87.3 veronica-units/min IntraVENous Continuous PRN Jhoana Najera DO        And    oxytocin (PITOCIN) 10 unit bolus from the bag  10 Units IntraVENous PRN Jhoana Najera DO        terbutaline (BRETHINE) injection 0.25 mg  0.25 mg SubCUTAneous Once PRN Jhoana Najera DO        ondansetron (ZOFRAN-ODT) disintegrating tablet 4 mg  4 mg Oral Q8H PRN Jhoana Najera DO        Or    ondansetron (ZOFRAN)

## 2025-03-25 NOTE — PROGRESS NOTES
Spiritual Health History and Assessment/Progress Note  [facility]    Initial Encounter, Rituals, Rites and Sacraments,  ,  ,      Name: Cathi Rosales MRN: [id number]    Age: 25 y.o.     Sex: female   Language: English   Islam: Worship   38 weeks gestation of pregnancy     Date: 3/25/2025                           Spiritual Assessment began in [facility] 3W MOM BABY        Referral/Consult From: Rounding   Encounter Overview/Reason: Initial Encounter, Rituals, Rites and Sacraments  Service Provided For: Patient and family together, Significant other    [place], Belief, Meaning:   Patient identifies as spiritual, is connected with a savannah tradition or spiritual practice, and has beliefs or practices that help with coping during difficult times  Family/Friends identify as spiritual, are connected with a savannah tradition or spiritual practice, and have beliefs or practices that help with coping during difficult times      Importance and Influence:  Patient has spiritual/personal beliefs that influence decisions regarding their health  Family/Friends have spiritual/personal beliefs that influence decisions regarding the patient's health    Community:  Patient feels well-supported. Support system includes: Spouse/Partner and Children  Family/Friends feel well-supported. Support system includes: Spouse/Partner and Children    Assessment and Plan of Care:     Patient Interventions include: Facilitated expression of thoughts and feelings and Provided sacramental/Cheondoism ritual  Family/Friends Interventions include: Facilitated expression of thoughts and feelings and Provided sacramental/Cheondoism ritual    Patient Plan of Care: Spiritual Care available upon further referral  Family/Friends Plan of Care: Spiritual Care available upon further referral    Electronically signed by FRANNY Zamora on 3/25/2025 at 3:31 PM

## 2025-03-25 NOTE — PROGRESS NOTES
Darleen ZHU at nurses station. Updated on pt SVE 2/70/-1 and tracing and ctx pattern reviewed. Orders received to place 25g cytotec.

## 2025-03-25 NOTE — FLOWSHEET NOTE
Patient oriented to room and unit.    Visitor policy discussed and verbalized understanding.    Wall cling of safe sleep and  safety discussed and patient verbalizes understanding.      Tdap FLU vaccines refused    Admission papers given/guide to post partum care and  care given[facility] and [facility].     Radha and MIKE paper given and explained

## 2025-03-25 NOTE — PROGRESS NOTES
Normal  delivery with Darleen ZHU via  at 0553. APGARS 9/9.  to normal nursery. Per refusal of patient no after birth pitocin, Darleen notified and orders for no pitocin.

## 2025-03-25 NOTE — L&D DELIVERY SUMMARY NOTE
Normal spontenous vaginal delivery of a viable baby girl, apgars 9/9, Wt not done at the time of this note d/t breastfeeding. Delivered over intact perineum.  Placenta spontaneous, complete and without abnormalities.  EBL 50cc. No lacerations to repair. Breastfeeding initiated.  Dr. Bee notified. Transfer to Mother/Baby when appropriate.

## 2025-03-25 NOTE — PROGRESS NOTES
Pt requesting to walk around, Darleen ZHU stated it was okay. Ordered to put back on EFM at 0500, and monitor contraction pattern. May start pitocin if needed.

## 2025-03-25 NOTE — LACTATION NOTE
Multiparous mom breast fed the longest for 11 months.  Her last baby had a lip and tongue tie. This baby appears to have a thick labial frenulum but it's stretchy.  Baby has good tongue mobility.  Showed mom how to get a deep latch on the right breast in cradle hold.  Swallow audible. Discussed frequency and duration of breastfeeds and signs of adequate milk transfer. Encouraged mom to hand express drops of colostrum at the start of a  breastfeed.  Recommended to avoid a pacifier for three weeks or until breastfeeding is well established.  Mom does not have an electric breast pump for home. Encouraged her to call her insurance company if need arises. Her insurance is [employer].  Went over breastfeeding resources.  Encouraged mom to call us with questions or concerns or for assistance.

## 2025-03-26 VITALS
HEART RATE: 78 BPM | DIASTOLIC BLOOD PRESSURE: 80 MMHG | TEMPERATURE: 97.7 F | OXYGEN SATURATION: 99 % | SYSTOLIC BLOOD PRESSURE: 108 MMHG | RESPIRATION RATE: 16 BRPM

## 2025-03-26 PROBLEM — Z3A.38 38 WEEKS GESTATION OF PREGNANCY: Status: RESOLVED | Noted: 2025-03-24 | Resolved: 2025-03-26

## 2025-03-26 LAB
HCT VFR BLD AUTO: 38.1 % (ref 34–48)
HGB BLD-MCNC: 11.5 G/DL (ref 11.5–15.5)

## 2025-03-26 PROCEDURE — 85014 HEMATOCRIT: CPT

## 2025-03-26 PROCEDURE — 36415 COLL VENOUS BLD VENIPUNCTURE: CPT

## 2025-03-26 PROCEDURE — 85018 HEMOGLOBIN: CPT

## 2025-03-26 PROCEDURE — 6370000000 HC RX 637 (ALT 250 FOR IP): Performed by: ADVANCED PRACTICE MIDWIFE

## 2025-03-26 PROCEDURE — APPNB30 APP NON BILLABLE TIME 0-30 MINS: Performed by: ADVANCED PRACTICE MIDWIFE

## 2025-03-26 RX ORDER — IBUPROFEN 800 MG/1
800 TABLET, FILM COATED ORAL EVERY 8 HOURS SCHEDULED
Qty: 120 TABLET | Refills: 3 | Status: SHIPPED | OUTPATIENT
Start: 2025-03-26

## 2025-03-26 RX ORDER — PSEUDOEPHEDRINE HCL 30 MG
100 TABLET ORAL 2 TIMES DAILY PRN
Qty: 60 CAPSULE | Refills: 0 | Status: SHIPPED | OUTPATIENT
Start: 2025-03-26

## 2025-03-26 RX ADMIN — DOCUSATE SODIUM 100 MG: 100 CAPSULE, LIQUID FILLED ORAL at 07:48

## 2025-03-26 RX ADMIN — ACETAMINOPHEN 1000 MG: 500 TABLET ORAL at 07:47

## 2025-03-26 NOTE — FLOWSHEET NOTE
Radha educational videos watched for self and  and papers signed.   Discharged to home in wheelchair with  in car seat

## 2025-03-26 NOTE — DISCHARGE INSTR - ACTIVITY
After Your Delivery Discharge Instructions    What to do after you leave the hospital:    Recommended diet: regular diet  Recommended activity: No driving for 1 weeks, no sex or tampon use for 6 weeks. No douching.  No heavy lifting (greater than baby and carrier) for 6 weeks.  Initially, avoid frequent ambulation with stairs - start slowly then increase as tolerated.  You may return to work in 6 weeks.  Showers are fine - avoid bath tubs, hot tubs, swimming.  Follow-up in 6 weeks for final postpartum visit.  Continue the prenatal vitamins with iron over the next 6 weeks minimum.  If you are breast-feeding, continue them until you are no longer nursing.    Call the Physician with any of these signs and symptoms:    Warning signs regarding stitches:  \"Popping\" of stitches  Foul smelling discharge or pus  More redness or streaks around stitches than before    Perineum / episiotomy care:  Continue care as in the hospital (sitz baths, squirt bottle, etc.)  No tub baths until OK'd by your Physician , showers are fine     After your delivery - signs and symptoms to watch for:  Fever - Oral temperature greater than 100.4 degrees Fahrenheit  Foul-smelling vaginal discharge  Headache unrelieved by \"pain meds\"  Difficulty urinating  Breasts reddened, hard, hot to the touch  Nipple discharge which is foul-smelling or contains pus  Increased pain at the site of the  vaginal area  Difficulty breathing with or without chest pain  New calf pain especially if only on one side  Sudden, continuing increased vaginal bleeding (heavier than a period) with or without clots  Unrelieved feelings of:  Inability to cope  Sadness  Anxiety  Lack of interest in baby  Insomnia  Crying     What to do at home:  Resume activity gradually   Don't lift anything heavier than baby and carrier until OK'd by your Physician   No sex until OK'd by your Physician  Eat regular nutritious meals  Take pain medication as prescribed whenever you need them  To

## 2025-03-26 NOTE — LACTATION NOTE
Mom reports baby has been cluster feeding. Encouraged frequent feeds at breast, hand expression and skin to skin to establish supply. Support provided and encouraged to call with any needs.

## 2025-03-26 NOTE — PROGRESS NOTES
PPD #1    Patient:  Cathi Rosales     Admit Date:  3/24/2025  2:06 PM  Medical Record Number:  [id number]   Today's Date: 3/26/2025    S: No complaints; tolerating diet: affirms ; ambulating well: affirms; voiding without difficulty:  affirms; bm: affirms; flatus: affirms; pain meds appropriate: affirm; vaginal bleeding: moderate lochia.    O:   Vitals:    03/25/25 1230 03/25/25 1500 03/25/25 2307 03/26/25 0809   BP: 109/68 96/70 112/75 108/80   Pulse: 74 73 72 78   Resp: 16 16 16 16   Temp: 97.9 °F (36.6 °C) 98.2 °F (36.8 °C) 97.7 °F (36.5 °C) 97.7 °F (36.5 °C)   TempSrc: Oral Oral Oral Oral   SpO2: 97% 98% 97% 99%     Gen: A&O, cooperative, pleasant   Heart: RRR   Lungs: CTA b/l   Abd; soft, NT, non distended, +BS  Back: no CVA or paraspinal tenderness   Ext: No clubbing, cyanosis, edema:no , no cords palpable, no calf tenderness   Neuro: intact   Uterus: firm, well contracted, nt   Perineum: intact, healing well   Ariadna pad: thin lochia    Lab Results   Component Value Date    HGB 11.5 03/26/2025    HCT 38.1 03/26/2025      Recent Results (from the past 72 hours)   POCT urine qual dipstick protein    Collection Time: 03/24/25 10:23 AM   Result Value Ref Range    Protein, UA Negative Negative   POCT urine qual dipstick glucose    Collection Time: 03/24/25 10:24 AM   Result Value Ref Range    Glucose, UA POC neg mg/dL   Drug Screen Multi Urine With Bup    Collection Time: 03/24/25  2:30 PM   Result Value Ref Range    Amphetamine Screen, Ur NEGATIVE NEGATIVE    Barbiturate Screen, Ur NEGATIVE NEGATIVE    Cocaine Metabolite, Urine NEGATIVE NEGATIVE    Benzodiazepine Screen, Urine NEGATIVE NEGATIVE    Buprenorphine Urine NEGATIVE NEGATIVE    Methadone Screen, Urine NEGATIVE NEGATIVE    Opiates, Urine NEGATIVE NEGATIVE    Phencyclidine, Urine NEGATIVE NEGATIVE    Cannabinoid Scrn, Ur NEGATIVE NEGATIVE    Fentanyl, Ur NEGATIVE NEGATIVE    Oxycodone Screen, Ur NEGATIVE NEGATIVE    Test Information       These drug screen

## 2025-03-26 NOTE — PROGRESS NOTES
[employer] [facility] screening results were discussed with parent. Questions answered. Brochure given to parent. Advised to monitor developmental milestones and contact physician for any concerns.   Jo Badillo, AuD

## 2025-03-26 NOTE — DISCHARGE INSTRUCTIONS
Follow-up with your OB doctor in 1 week if  delivery or in  6 weeks for vaginal delivery unless otherwise instructed.   Call office for an appointment.    For breastfeeding support, you can contact our lactation specialists at [phone] or [phone]    DIET  Eat a well balanced diet focusing on foods high in fiber and protein  Drink plenty of fluids especially water.  To avoid constipation you may take a mild stool softener as recommended by your doctor or midwife.    ACTIVITY  Gradually increase your activity.  Resume exercise regimen only after advised by your doctor or midwife.  Avoid lifting anything heavier than your baby or a gallon of milk for SIX weeks.   Avoid driving until your doctor or midwife has given their approval.  Rise slowly from a lying to sitting and then a standing position.  Climb stairs one at a time.  Use caution when carrying your baby up and down the stairs.  No sexual activity for 6 weeks or until advised by your doctor - Nothing in vagina: intercourse, tampons, or douching.   Be prepared to discuss family planning at your follow-up OB visit.   You may feel tired or have a lack of energy.  You may continue your prenatal vitamin to replenish nutrients post delivery.  Nap when baby naps to catch up on sleep.  May return to work or school in 6 weeks or as directed by OB.     EMOTIONS  You may feed mooney, sad, teary, & overwhelmed.  Contact your OB provider if you feel you may be showing signs of postpartum depression, or have thoughts of harming yourself or your infant.  If infant will not stop crying, contact another adult for help or place infant in their crib on their back and take a break.  NEVER shake your infant.      BLEEDING  Vaginal bleeding will decrease in amount over the next few weeks.  You will notice that as your activity increases, your flow may increase.  This is your body's way of telling you, you need to take things easier and rest more often.  Call your

## 2025-03-26 NOTE — DISCHARGE SUMMARY
Department of Obstetrics and Gynecology  Labor and Delivery  Discharge Summary    Patient:  Cathi Rosales         Medical Record Number:  [id number]    Admit Date:  3/24/2025  2:06 PM    Discharge Date: 3/26/2025    Final Diagnosis:   Principal Problem (Resolved):    38 weeks gestation of pregnancy  Active Problems:     (spontaneous vaginal delivery)    Term birth of  female      Chief Complaint - Presenting Illness - Physical Findings:   38 weeks gestation of pregnancy [Z3A.38]  HPI:   The patient is a 25 y.o.  3 parity 2 at 38 weeks' 3 days' gestation presents to L&D from ob office for IOL secondary to IUGR and oligohydramnios. JULIAN 7.3 cms per last u/s 2025. Has mild cramping. Cervix was checked there: closed, thick. Refer to progress note. Last seen by MFM 2025.     Current obstetric history is significant for:  Poor fetal growth. HC 33rd%. AC 2nd% on 3/13/2025   Low normal amniotic fluid volume on 3/20/2025   Elevated cord Doppler SD ratios without AREDF. 2/10/2025    Hospital Course:   Delivery Summary:  Isabell Rosales [[id number]]      Labor Events     Labor: No   Steroids: None  Cervical Ripening Date/Time:      Cervical Ripening Type: Misoprostol  Rupture Date/Time:  3/25/25 03:55:00   Rupture Type: SROM  Fluid Color: Clear  Fluid Odor: None  Fluid Volume: Scant  Induction: Misoprostol  Augmentation: None  Labor Complications: None       Anesthesia    Method: None       Labor Event Times      Labor onset date/time:        Dilation complete date/time:  3/25/25 05:50:00     Start pushing date/time:  3/25/2025 05:52:00   Decision date/time (emergent ):            Labor Length    2nd stage: 0h 03m  3rd stage: 0h 06m       Delivery Details      Delivery Date: 3/25/25 Delivery Time: 05:53:00   Delivery Type: Vaginal, Spontaneous              [place] Presentation    Presentation: Vertex       Shoulder Dystocia    Shoulder Dystocia Present?: No       Assisted

## 2025-03-26 NOTE — CARE COORDINATION
SW Discharge Planning   SW received consult for \" history of depression \"    SW received patient's chart, and did not note any current concerns for depression. SW did see that patient had significant concerns for depression over 10 years ago when she was a teenager. No other concerns noted in the chart history since then. Unless patient's current post partum depression score is high, SW does not need to see patient as concerns are over 10 years old.     Please consult SW if there are any current concerns for depression or support after completing post partum depression assessment, or if current concerns arise     Electronically signed by JOSE Andrews on 3/26/2025 at 7:43 AM

## 2025-03-31 ENCOUNTER — TELEPHONE (OUTPATIENT)
Dept: OBGYN | Age: 25
End: 2025-03-31

## 2025-03-31 NOTE — TELEPHONE ENCOUNTER
Patient is asking to cancel her prenatal appointments with Darleen Edwards as she had the baby on 3/25.

## 2025-05-05 ENCOUNTER — POSTPARTUM VISIT (OUTPATIENT)
Dept: OBGYN | Age: 25
End: 2025-05-05
Payer: COMMERCIAL

## 2025-05-05 ENCOUNTER — HOSPITAL ENCOUNTER (OUTPATIENT)
Age: 25
Discharge: HOME | End: 2025-05-05
Payer: COMMERCIAL

## 2025-05-05 VITALS
DIASTOLIC BLOOD PRESSURE: 54 MMHG | BODY MASS INDEX: 26.04 KG/M2 | OXYGEN SATURATION: 98 % | TEMPERATURE: 98.1 F | SYSTOLIC BLOOD PRESSURE: 119 MMHG | HEART RATE: 81 BPM | WEIGHT: 147 LBS

## 2025-05-05 DIAGNOSIS — F41.8 ANXIETY WITH DEPRESSION: ICD-10-CM

## 2025-05-05 DIAGNOSIS — Z86.39 HISTORY OF IRON DEFICIENCY: ICD-10-CM

## 2025-05-05 DIAGNOSIS — Z86.39 HISTORY OF HYPOTHYROIDISM: ICD-10-CM

## 2025-05-05 DIAGNOSIS — E55.9 VITAMIN D DEFICIENCY: ICD-10-CM

## 2025-05-05 DIAGNOSIS — O26.819 PREGNANCY RELATED FATIGUE, ANTEPARTUM: Primary | ICD-10-CM

## 2025-05-05 DIAGNOSIS — O26.819 PREGNANCY RELATED FATIGUE, ANTEPARTUM: ICD-10-CM

## 2025-05-05 LAB
25(OH)D3 SERPL-MCNC: 32.5 NG/ML (ref 30–100)
ALBUMIN SERPL-MCNC: 4.6 G/DL (ref 3.5–5.2)
ALP SERPL-CCNC: 91 U/L (ref 35–104)
ALT SERPL-CCNC: 30 U/L (ref 0–32)
ANION GAP SERPL CALCULATED.3IONS-SCNC: 12 MMOL/L (ref 7–16)
AST SERPL-CCNC: 23 U/L (ref 0–31)
BILIRUB SERPL-MCNC: 0.5 MG/DL (ref 0–1.2)
BUN SERPL-MCNC: 16 MG/DL (ref 6–20)
CALCIUM SERPL-MCNC: 10.4 MG/DL (ref 8.6–10.2)
CHLORIDE SERPL-SCNC: 101 MMOL/L (ref 98–107)
CO2 SERPL-SCNC: 26 MMOL/L (ref 22–29)
CREAT SERPL-MCNC: 0.7 MG/DL (ref 0.5–1)
ERYTHROCYTE [DISTWIDTH] IN BLOOD BY AUTOMATED COUNT: 19.4 % (ref 11.5–15)
GFR, ESTIMATED: >90 ML/MIN/1.73M2
GLUCOSE SERPL-MCNC: 81 MG/DL (ref 74–99)
HCT VFR BLD AUTO: 43.8 % (ref 34–48)
HGB BLD-MCNC: 13.9 G/DL (ref 11.5–15.5)
MCH RBC QN AUTO: 26.3 PG (ref 26–35)
MCHC RBC AUTO-ENTMCNC: 31.7 G/DL (ref 32–34.5)
MCV RBC AUTO: 83 FL (ref 80–99.9)
PLATELET # BLD AUTO: 257 K/UL (ref 130–450)
PMV BLD AUTO: 11.9 FL (ref 7–12)
POTASSIUM SERPL-SCNC: 4.6 MMOL/L (ref 3.5–5)
PROT SERPL-MCNC: 7.8 G/DL (ref 6.4–8.3)
RBC # BLD AUTO: 5.28 M/UL (ref 3.5–5.5)
SODIUM SERPL-SCNC: 139 MMOL/L (ref 132–146)
TSH SERPL DL<=0.05 MIU/L-ACNC: 2.74 UIU/ML (ref 0.27–4.2)
WBC OTHER # BLD: 6.9 K/UL (ref 4.5–11.5)

## 2025-05-05 PROCEDURE — 84443 ASSAY THYROID STIM HORMONE: CPT

## 2025-05-05 PROCEDURE — 82306 VITAMIN D 25 HYDROXY: CPT

## 2025-05-05 PROCEDURE — 99213 OFFICE O/P EST LOW 20 MIN: CPT | Performed by: ADVANCED PRACTICE MIDWIFE

## 2025-05-05 PROCEDURE — 85027 COMPLETE CBC AUTOMATED: CPT

## 2025-05-05 PROCEDURE — 80053 COMPREHEN METABOLIC PANEL: CPT

## 2025-05-05 PROCEDURE — 0503F POSTPARTUM CARE VISIT: CPT | Performed by: ADVANCED PRACTICE MIDWIFE

## 2025-05-05 PROCEDURE — 36415 COLL VENOUS BLD VENIPUNCTURE: CPT

## 2025-05-05 RX ORDER — PYRIDOXINE HCL (VITAMIN B6) 50 MG
TABLET ORAL
COMMUNITY

## 2025-05-05 NOTE — PROGRESS NOTES
Here today for post partum care   on 3/25/2025, girl 6#2 and is breast feeding  Post partum depression screen completed with a score of 4.  Patient has no complaints. Would like a referral to a lactation consultant. Baby having a hard time latching.   Discharge instructions have been discussed with the patient. Patient advised to call our office with any questions or concerns.   Voiced understanding.

## 2025-05-05 NOTE — PROGRESS NOTES
aCthi Rosales (:  2000) is a 25 y.o. female,Established patient, here for evaluation of the following chief complaint(s):      Subjective   HPI:   Here for 6 wk pp visit. C/o diff with latching, varies for each breast. Enc to change positions, have ped ck for tongue tie, enc to not give up,, pump breast not emptied. Also noticed she keeps letting go of the breast so I explained to hold on to breast like C position, weight will keep pulling out of good latch. Baby is nursing and latched here today.   Still has lochia, very light pink. No period. Did resume coitus unprotected. Discussed GUILLEN and 3 Q's, enc condoms as a back up. And discussed NFPFA method, she knows how. Pregnancy would be ok.   Denies depression, EPDS 4. She admits to waves of dep/anxiety stephy noticeable when she has a let down. She read dispquietrevolution reflex. So I enc Hoahaoism music, camomile tea, think of all God's blessings. Discussed getting outside, on a date,  etc.   Admits to fatigue.    ROS:        Objective   Physical Exam:    BP (!) 119/54   Pulse 81   Temp 98.1 °F (36.7 °C)   Wt 66.7 kg (147 lb)   LMP 2024   SpO2 98%   Breastfeeding Yes   BMI 26.04 kg/m²   Breast soft, no mastitis. Abd soft, nt x 4Qs. No mass, no HSM. Pelvic Nml vulva, tone weak, discussed kegels and coitus. Uterus/adnexa wnl, nml involuton, no mass or pain.    Wt down 13#, discussed healthy diet, portions, walking/exercise QD       Assessment/Plan:  Encounter Diagnoses   Name Primary?    Anxiety with depression     Pregnancy related fatigue, antepartum Yes    Breast feeding problem in      History of hypothyroidism     History of iron deficiency     Vitamin D deficiency         Return in about 4 months (around 2025) for Annual.    Orders Placed This Encounter    CBC     Standing Status:   Future     Expected Date:   2025     Expiration Date:   2026    Comprehensive Metabolic Panel     Standing Status:   Future     Expected

## 2025-08-27 ENCOUNTER — TELEPHONE (OUTPATIENT)
Age: 25
End: 2025-08-27